# Patient Record
Sex: FEMALE | NOT HISPANIC OR LATINO | Employment: FULL TIME | URBAN - METROPOLITAN AREA
[De-identification: names, ages, dates, MRNs, and addresses within clinical notes are randomized per-mention and may not be internally consistent; named-entity substitution may affect disease eponyms.]

---

## 2018-07-12 ENCOUNTER — TELEPHONE (OUTPATIENT)
Dept: HEMATOLOGY | Facility: CLINIC | Age: 23
End: 2018-07-12

## 2018-07-12 NOTE — TELEPHONE ENCOUNTER
Phone call received from Deann Grove, mother of Zahida Grove.  Zahida has not been seen by our center but is scheduled as a new patient on 7/24/18.  She has a recenet diagnosis of TTP.  Per mother, they have been seen by hematology at the [facility] and at [facility].  Since we have not seen her, we do not have access to those records.    Mom says they are seeing us as a second opinion, but would like to transfer care to our center.  Mom says the current plan per outside providers is to start Rituxan treatments, with first dose scheduled for 7/23, the day before her visit.    She would like to discuss with Dr. Muhammad if possible or be seen prior to 7/23 so they can be assured that this is the best plan of care.    I recommended that she ask that the pertinent records be faxed to our center now so they can be reviewed and I would share this request for an earlier appt with Dr. Modi and nursing colleagues.  Best number to reach Mom back for an earlier appt is [phone].    Isabell Duggan, RN - Nurse Clinician - [facility] - [phone]

## 2018-07-17 ENCOUNTER — OFFICE VISIT (OUTPATIENT)
Dept: HEMATOLOGY | Facility: CLINIC | Age: 23
End: 2018-07-17

## 2018-07-17 ENCOUNTER — OFFICE VISIT (OUTPATIENT)
Dept: HEMATOLOGY | Facility: CLINIC | Age: 23
End: 2018-07-17
Attending: INTERNAL MEDICINE
Payer: COMMERCIAL

## 2018-07-17 VITALS
HEIGHT: 69 IN | SYSTOLIC BLOOD PRESSURE: 116 MMHG | BODY MASS INDEX: 26.94 KG/M2 | DIASTOLIC BLOOD PRESSURE: 71 MMHG | OXYGEN SATURATION: 100 % | RESPIRATION RATE: 12 BRPM | TEMPERATURE: 98 F | WEIGHT: 181.9 LBS | HEART RATE: 64 BPM

## 2018-07-17 DIAGNOSIS — Z71.9 ENCOUNTER FOR COUNSELING: Primary | ICD-10-CM

## 2018-07-17 DIAGNOSIS — M31.19 ACQUIRED TTP (H): ICD-10-CM

## 2018-07-17 PROCEDURE — 99203 OFFICE O/P NEW LOW 30 MIN: CPT | Performed by: INTERNAL MEDICINE

## 2018-07-17 RX ORDER — DIPHENHYDRAMINE HCL 50 MG
50 CAPSULE ORAL ONCE
Status: CANCELLED
Start: 2018-07-17

## 2018-07-17 RX ORDER — ACETAMINOPHEN 325 MG/1
650 TABLET ORAL ONCE
Status: CANCELLED
Start: 2018-07-17

## 2018-07-17 ASSESSMENT — PAIN SCALES - GENERAL: PAINLEVEL: NO PAIN (0)

## 2018-07-17 NOTE — MR AVS SNAPSHOT
After Visit Summary   7/17/2018    Zahida Grove    MRN: [id number]           Patient Information     Date Of Birth          1995        Visit Information        Provider Department      7/17/2018 1:59 PM Aminta Boston LICSW [facility]        Today's Diagnoses     Encounter for counseling    -  1       Follow-ups after your visit        Your next 10 appointments already scheduled     Jul 31, 2018 11:00 AM CDT   Infusion 360 with UC SPEC INFUSION, UC 44 ATC   [facility] ([facility])    [address]  [address]  [place] [address]   [phone]            Aug 07, 2018  8:00 AM CDT   Infusion 360 with UC SPEC INFUSION, UC 45 ATC   [facility] ([facility])    [address]  [address]  [place] [address] [phone]            Aug 09, 2018 12:00 PM CDT   RETURN CLOTTING DISORDER with Damián Modi MD   [facility] ([facility])    [facility] [address]  [address]  [place] [address]   [phone]            Aug 14, 2018  9:00 AM CDT   Infusion 360 with UC SPEC INFUSION, UC 46 ATC   [facility] ([facility])    [address]  [address]  [place] [id number] [phone]              Who to contact     If you have questions or need follow up information about today's clinic visit or your schedule please contact [facility] directly at [phone].  Normal or non-critical lab and imaging results will be communicated to you by MyChart, letter or phone within 4 business days after the clinic has received the results. If you do not hear from us within 7 days, please contact the clinic through FSLogixt or  phone. If you have a critical or abnormal lab result, we will notify you by phone as soon as possible.  Submit refill requests through [email or web address] or call your pharmacy and they will forward the refill request to us. Please allow 3 business days for your refill to be completed.          Additional Information About Your Visit        The [facility] Information     [email or web address] gives you secure access to your electronic health record. If you see a primary care provider, you can also send messages to your care team and make appointments. If you have questions, please call your primary care clinic.  If you do not have a primary care provider, please call [phone] and they will assist you.        Care EveryWhere ID     This is your Care EveryWhere ID. This could be used by other organizations to access your [place] medical records  [id number]         Blood Pressure from Last 3 Encounters:   07/23/18 128/56   07/17/18 116/71    Weight from Last 3 Encounters:   07/23/18 83.7 kg (184 lb 9.6 oz)   07/17/18 82.5 kg (181 lb 14.4 oz)              Today, you had the following     No orders found for display       Primary Care Provider    None Specified       No address on file        Equal Access to Services     [facility]: Hadii garrett Ochoa, wadalton rubin, qalucía ortiz, mickey santana . So [facility] [phone].    ATENCIÓN: Si habla español, tiene a pendleton disposición servicios gratuitos de asistencia lingüística. Llame al [phone].    We comply with applicable federal civil rights laws and [place] laws. We do not discriminate on the basis of race, color, national origin, age, disability, sex, sexual orientation, or gender identity.            Thank you!     Thank you for choosing [facility]  for your care. Our goal is always to provide you with excellent care. Hearing back from our patients is one way we can continue to improve our services. Please  take a few minutes to complete the written survey that you may receive in the mail after your visit with us. Thank you!             Your Updated Medication List - Protect others around you: Learn how to safely use, store and throw away your medicines at www.disposemymeds.org.      Notice  As of 7/17/2018 11:59 PM    You have not been prescribed any medications.

## 2018-07-17 NOTE — MR AVS SNAPSHOT
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  TTP occurs when the body's immune system makes an error and starts attacking a protein called UCTNSK89 with antibodies.  Antibodies are part of the immune system; for example, when we give you a vaccine, the goal is for you to make antibodies against that illness.      1. Continue Prednisone 100 mg daily until July 23.   2. Then take Prednsione 50 mg daily until July 30, Prednisone 20 mg August 6, then Prednisone 10 mg until August 13 and then stop  3. We will arrange for Rituximab infusions (weekly for 4 weeks) and you will receive a call to schedule these.  4. I am happy to help with blood counts as often as you would like.  I have placed standing orders for blood counts to be performed.  [facility] appears to be the closest lab to you.  5. I will get back to you regarding care for your apheresis catheter and when it should be removed.  I think we should give some more time before we remove it just in case you need more plasma exchange.  6. I will see you back in about 4 weeks to consider repeating your GIVQNL70 tests.    In the top picture below (A) you can see DEEOBR20 is important for making normal blood clots. The job of IWEVMO98 (pac-man shape in the picture) is to break up a sticky protein called Von Willebrand Factor, abreviated vWF, into small peices.    The bottom picture shows what happens in TTP.  BXAFVO17 is very low because your body is destroying it.  Because KFDUDW86 is low, vWF isn't broken up and these large sticky  proteins starts clumping together platelets.            Treatment for Acquired TTP is by using Plasma Exchange, which temporarily does 3 things:  1) Gets rid of the large, sticky vWF   2) removes antibodies that cause TTP and  3) gives you back BGOLJI09 from healthy blood donors.     The other part of treatment is to suppress the immune system and our standard first drug treatment is called Prednisone.  We will also likely try a medicine called Rituximab.  The medicine is given once per week for a total of 4 doses.    For more information about TTP I recommend:  Https://www.answeringttp.org/  https://www.nhlbi.nih.gov/health/health-topics/topics/ttp/ (you can also find this website by typing in NIH and TTP)    Reasons to call us at the [facility]:  1. Return of the symptoms you had when you were diagnosed with Acquired TTP  2. Easy bruising or bleeding  3. Confusion, headaches, forgetfulness  4. Unexplained fever    I recommend we follow your platelet count at least every week for the first month.  This will be done at the time of your plasma exchanges and then we can make a plan for those lab tests once you are finished with plasma exchange.  Then I recommend a blood count and checking the IYCQBE60 at 30 days from the end of your hospital stay. After that visit, I'll plan to see you about once per month until you are 3 months out, and then about every 3 months for at least the first year.              Follow-ups after your visit        Follow-up notes from your care team     Return in about 4 weeks (around 8/14/2018).      Who to contact     If you have questions or need follow up information about today's clinic visit or your schedule please contact [facility] directly at [phone].  Normal or non-critical lab and imaging results will be communicated to you by MyChart, letter or phone within 4 business days after the clinic has received the  "results. If you do not hear from us within 7 days, please contact the clinic through [email or web address] or phone. If you have a critical or abnormal lab result, we will notify you by phone as soon as possible.  Submit refill requests through [email or web address][employer]lt or call your pharmacy and they will forward the refill request to us. Please allow 3 business days for your refill to be completed.          Additional Information About Your Visit        [email or web address].lt Information     [email or web address] lets you send messages to your doctor, view your test results, renew your prescriptions, schedule appointments and more. To sign up, go to [email or web address] . Click on \"Log in\" on the left side of the screen, which will take you to the Welcome page. Then click on \"Sign up Now\" on the right side of the page.     You will be asked to enter the access code listed below, as well as some personal information. Please follow the directions to create your username and password.     Your access code is: [email or web address]  Expires: 10/15/2018  1:58 PM     Your access code will  in 90 days. If you need help or a new code, please call your [place] clinic or [phone].        Care EveryWhere ID     This is your Care EveryWhere ID. This could be used by other organizations to access your [place] medical records  [id number]        Your Vitals Were     Pulse Temperature Respirations Height Pulse Oximetry BMI (Body Mass Index)    64 98  F (36.7  C) (Oral) 12 1.74 m (5' 8.5\") 100% 27.26 kg/m2       Blood Pressure from Last 3 Encounters:   18 116/71    Weight from Last 3 Encounters:   18 82.5 kg (181 lb 14.4 oz)              Today, you had the following     No orders found for display       Primary Care Provider    None Specified       No primary provider on file.        Equal Access to Services     TIEN AGUILAR : Griffin Ochoa, randi rubin, mickey alas. So wayoshi " [phone].    ATENCIÓN: Si aideela robert, tiene a pendleton disposición servicios gratuitos de asistencia lingüística. Mary al [phone].    We comply with applicable federal civil rights laws and [place] laws. We do not discriminate on the basis of race, color, national origin, age, disability, sex, sexual orientation, or gender identity.            Thank you!     Thank you for choosing [facility]  for your care. Our goal is always to provide you with excellent care. Hearing back from our patients is one way we can continue to improve our services. Please take a few minutes to complete the written survey that you may receive in the mail after your visit with us. Thank you!             Your Updated Medication List - Protect others around you: Learn how to safely use, store and throw away your medicines at www.disposemymeds.org.      Notice  As of 7/17/2018  1:58 PM    You have not been prescribed any medications.

## 2018-07-17 NOTE — PROGRESS NOTES
[facility]  [address] [place] [address]  Phone: [phone], Fax: [phone]    Outpatient Visit Note:    Patient: Zahida Grove  MRN: [id number]  : 1995  JD: 2018    Reason for Consultation:  Zahida Grove is self referred for management of acquired TTP    History of Present Illness:  Zahida Grove is a 22 year old woman who was previously healthy until a recent diagnosis of acquired TTP.   She had recently returned home for being abroad in [place] for school and presented to her PCP with fatigue and heavy menstrual cycle.  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  She was initially treated with corticosteroids and IVIG ( and ) and then after review of her blood smear, she was noted to have 2% schistocytes and the working diagnosis was changed to acquired TTP.  She was started plasma exchange on  and VBJECV76 was drawn and found to be 12% with a pos Inhibitor ([place] lab).  Creatinine during the hospitalization peaked at 1.02 and she reports today that she never really had much in terms of neurologic symptoms.  She was discharged home on  and followed frequent platelet counts and had a peak of 520 on 18.  On a routine lab check on 18 her platelets had fallen to 45 with no other symptoms.  Thus plasma exchange was restarted for this exacerbation and she continued on high-dose Prednisone.  She again responded well to plasma exchange and platelets gonzalo to 132 on  and was discharged home with a plan for every-other-day exchange x 2.  Her last plasma exchange was  and has been doing well.      She reports no fevers, chills or night sweats.  Her apheresis catheter is in place without any discomfort or redness.  She reports no headaches, vision changes, altered thinking or concentration.  She more has been frustrated by this illness  and hospitalizations rather than feeling ill.      Past Medical History:  No past medical history on file.    Past Surgical History:  No past surgical history on file.    Medications:  Current Outpatient Prescriptions   Medication Sig Dispense Refill     folic acid (FOLVITE) 1 MG tablet Take 1 mg by mouth       omeprazole (PRILOSEC) 40 MG capsule Take 40 mg by mouth       predniSONE (DELTASONE) 20 MG tablet Take 20 mg by mouth          Allergies:  No Known Allergies    ROS:  Denies any bleeding issues. No gum bleeding, No nose bleed. Denies any hematuria or blood in stools. Denies any ecchymosis. Denies any lower extremities swelling or pain. Denies any fever, no chest pain. Denies any shortness of breath.     Social History:  Denies any tobacco use. No significant alcohol use. Denies any illicit drug use.  She is entering her final year of college.    Family History:  None of TTP but several family members have autoimmune disorders    Objectives:  Pleasant 22 year old female in no acute distress.  Vitals: B/P: 116/71, T: 98, P: 64, R: 12, Wt: 181 lbs 14.4 oz  Exam:   Gen: Appears well, no distress  HEENT: No scleral icterus or hemorrahge, no wet purpura, no lymphadenopathy  CV: regular, no murmurs  Pulm: clear  Abd: soft, nontender, no splenomegaly  Ext: no edema  Skin: no ecchymoses, catheter is clean and dry and no erythema  Neuro: no focal deficits, normal affect, normal cognition    Labs:  Results for MOODY GROVE (MRN [id number]) as of 8/7/2018 09:46   Ref. Range 7/18/2018 12:55   WBC Latest Ref Range: 4.0 - 11.0 10e9/L 15.4 (H)   Hemoglobin Latest Ref Range: 11.7 - 15.7 g/dL 11.9   Hematocrit Latest Ref Range: 35.0 - 47.0 % 36.7   Platelet Count Latest Ref Range: 150 - 450 10e9/L 363       Imaging:  none    Assessment:  In summary, Moody Grove is a 22 year old woman with acquired TTP, now with a second treatment response (platelet > 150) after an exacerbation of the disease while on high-dose prednisone.   I think she would benefit from up-front rituximab to prevent recurrence and at least 3 weeks of high-dose prednisone.  We will carefully monitor her platelet count for any evidence of recurrence with a plan to intervene with re-institution of plasma exchange with evidence of a downward trend in platelets or clinical evidence of neurologic sequelae of the disease.    Plan:  1. Majority of today's visit was spent counseling the patient regarding the diagnosis, treatment and natural history of acquired TTP.  I told her about resources for her including those on the internet (Facebook support pages, [employer] TTP foundation and understandingttp.com) as well as our own TTP support group [facility] at [facility]  2. Continue Prednisone 100mg until July 23 (end of 30 days of high dose Pred), then decrease to 50mg until July 30, then decrease to 20mg until Aug 6, then decrease to 10mg until Aug 13 and then stop.  3. Rituximab 375mg/m2 x 4 doses to be arranged  4. CBC standing orders have been placed, and recommend checking about 2x per wek  5. We will call regarding apheresis line care and I will alert our apheresis service about her.  6. RTC 4 weeks for possible TVNPED29 activity check and we will be in close contact by phone.    The patient is given our center's contact information and is instructed to call if she should have any further questions or concerns.      Martha Hunter, nurse clinician is present throughout the entire clinic visit with the patient today.  Patient understands and agrees with the above plan and recommendation.    Damián Modi MD   of Medicine  [facility] [facility] [employer]

## 2018-07-17 NOTE — PATIENT INSTRUCTIONS
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  TTP occurs when the body's immune system makes an error and starts attacking a protein called REPGZL69 with antibodies.  Antibodies are part of the immune system; for example, when we give you a vaccine, the goal is for you to make antibodies against that illness.      1. Continue Prednisone 100 mg daily until July 23.   2. Then take Prednsione 50 mg daily until July 30, Prednisone 20 mg August 6, then Prednisone 10 mg until August 13 and then stop  3. We will arrange for Rituximab infusions (weekly for 4 weeks) and you will receive a call to schedule these.  4. I am happy to help with blood counts as often as you would like.  I have placed standing orders for blood counts to be performed.  [facility] appears to [facility] the closest lab to you.  5. I will get back to you regarding care for your apheresis catheter and when it should be removed.  I think we should give some more time before we remove it just in case you need more plasma exchange.  6. I will see you back in about 4 weeks to consider repeating your AEDXNN54 tests.    In the top picture below (A) you can see NOCHRJ40 is important for making normal blood clots. The job of CWCKHE97 (pac-man shape in the picture) is to break up a sticky protein called Von Willebrand Factor, abreviated vWF, into small peices.    The bottom picture shows what happens in TTP.  TORQWH85 is very low because your body is destroying it.  Because JUMQXA91 is low, vWF isn't broken up and these large sticky proteins starts clumping together platelets.            Treatment for Acquired TTP is by using Plasma Exchange, which temporarily does 3 things:  1) Gets rid of the large, sticky vWF   2) removes antibodies that cause TTP and  3) gives you back VXOKYD74 from healthy blood donors.     The other part of treatment is to suppress the immune system and our standard first  drug treatment is called Prednisone.  We will also likely try a medicine called Rituximab.  The medicine is given once per week for a total of 4 doses.    For more information about TTP I recommend:  Https://www.answeringttp.org/  https://www.nhlbi.nih.gov/health/health-topics/topics/ttp/ (you can also find this website by typing in NIH and TTP)    Reasons to call us at the [facility]:  1. Return of the symptoms you had when you were diagnosed with Acquired TTP  2. Easy bruising or bleeding  3. Confusion, headaches, forgetfulness  4. Unexplained fever    I recommend we follow your platelet count at least every week for the first month.  This will be done at the time of your plasma exchanges and then we can make a plan for those lab tests once you are finished with plasma exchange.  Then I recommend a blood count and checking the TQFLYR57 at 30 days from the end of your hospital stay. After that visit, I'll plan to see you about once per month until you are 3 months out, and then about every 3 months for at least the first year.

## 2018-07-17 NOTE — PROGRESS NOTES
[facility]  Social Work Note    Zahida Grove is a 22 year old female who has been seen at [facility] and [facility] for acquired TTP. She presented to clinic this date for a second opinion regarding acquired TTP.    Met with Zahida and her parents to introduce self and role of SW, discuss applicable resources, and offer support.    Zahida signed consents this date for person to person communication including her parents and uncle and authorization for electronic communication for text and email, also including her mother.  These were sent to HIM for scanning.    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 Encouraged her to register with the office of accommodations on campus. 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  She stated that her internship site has been very flexible and she does not anticipate issues with juggling her internship and potential time away from internship for treatment.  Encouraged her to utilize the center for support in navigating this, if needed.  Discussed FMLA.    Brief education provided regarding mental health challenges that can accompany a diagnosis like TTP and encouraged her to utilize [facility] as needed for additional support.  Provided Zahida and family with information on the [facility]'s TTP support group and they stated they would like more information on the next support group, once scheduled.  Provided affirming statements to normalize and validate their experience and supportive counseling was offered throughout the encounter.    They did not have other questions or concerns at the conclusion of our visit.  Social Work contact information was provided.  Will plan to follow-up again during her next clinic visit at the [facility].    Aminta Boston, CHANDAN, LICSW, ACM  Clinical   [facility]  Advanced Therapies Program and  Clinical Trials Services  Phone: [phone]

## 2018-07-18 ENCOUNTER — ONCOLOGY VISIT (OUTPATIENT)
Dept: INFUSION THERAPY | Facility: CLINIC | Age: 23
End: 2018-07-18
Payer: COMMERCIAL

## 2018-07-18 DIAGNOSIS — M31.19 ACQUIRED TTP (H): ICD-10-CM

## 2018-07-18 LAB
DIFFERENTIAL METHOD BLD: ABNORMAL
ERYTHROCYTE [DISTWIDTH] IN BLOOD BY AUTOMATED COUNT: 14.6 % (ref 10–15)
HCT VFR BLD AUTO: 36.7 % (ref 35–47)
HGB BLD-MCNC: 11.9 G/DL (ref 11.7–15.7)
LYMPHOCYTES # BLD AUTO: 0.3 10E9/L (ref 0.8–5.3)
LYMPHOCYTES NFR BLD AUTO: 2 %
MCH RBC QN AUTO: 31.6 PG (ref 26.5–33)
MCHC RBC AUTO-ENTMCNC: 32.4 G/DL (ref 31.5–36.5)
MCV RBC AUTO: 98 FL (ref 78–100)
MONOCYTES # BLD AUTO: 0.6 10E9/L (ref 0–1.3)
MONOCYTES NFR BLD AUTO: 4 %
NEUTROPHILS # BLD AUTO: 14.5 10E9/L (ref 1.6–8.3)
NEUTROPHILS NFR BLD AUTO: 94 %
PLATELET # BLD AUTO: 363 10E9/L (ref 150–450)
PLATELET # BLD EST: ABNORMAL 10*3/UL
RBC # BLD AUTO: 3.76 10E12/L (ref 3.8–5.2)
RBC MORPH BLD: NORMAL
WBC # BLD AUTO: 15.4 10E9/L (ref 4–11)

## 2018-07-18 PROCEDURE — 99207 ZZC NO CHARGE NURSE ONLY: CPT

## 2018-07-18 PROCEDURE — 36592 COLLECT BLOOD FROM PICC: CPT | Performed by: INTERNAL MEDICINE

## 2018-07-18 PROCEDURE — 85025 COMPLETE CBC W/AUTO DIFF WBC: CPT | Performed by: INTERNAL MEDICINE

## 2018-07-18 RX ORDER — HEPARIN SODIUM (PORCINE) LOCK FLUSH IV SOLN 100 UNIT/ML 100 UNIT/ML
5 SOLUTION INTRAVENOUS
Status: DISCONTINUED | OUTPATIENT
Start: 2018-07-18 | End: 2018-07-18 | Stop reason: HOSPADM

## 2018-07-18 RX ADMIN — HEPARIN SODIUM (PORCINE) LOCK FLUSH IV SOLN 100 UNIT/ML 5 ML: 100 SOLUTION at 13:03

## 2018-07-18 RX ADMIN — HEPARIN SODIUM (PORCINE) LOCK FLUSH IV SOLN 100 UNIT/ML 5 ML: 100 SOLUTION at 13:02

## 2018-07-18 NOTE — PROGRESS NOTES
Citrate removed from both red and blue lumens. CBC sent. Both red and blue lumens flushed per documentation on MAR. Dressing dry and intact and not due to be changed until 7/20/18.    Discharged ambulatory with mom. To return tomorrow for flush of both catheter ports.    Fanta Chowdhury RN

## 2018-07-18 NOTE — MR AVS SNAPSHOT
After Visit Summary   7/18/2018    Zahida Grove    MRN: [id number]           Patient Information     Date Of Birth          1995        Visit Information        Provider Department      7/18/2018 12:30 PM BAY 3 INFUSION [facility]        Today's Diagnoses     Acquired TTP (H)           Follow-ups after your visit        Your next 10 appointments already scheduled     Jul 19, 2018 12:30 PM CDT   Level 1 with BAY 2 INFUSION   [facility] ([facility])    [address] [place] [id number] [phone]            Aug 09, 2018 12:00 PM CDT   RETURN CLOTTING DISORDER with Damián Modi MD   [facility] ([facility])    Formerly named [facility] [place] [id number] [phone]              Future tests that were ordered for you today     Open Standing Orders        Priority Remaining Interval Expires Ordered    *CBC with platelets differential Routine 19/20 twice per week 7/17/2019 7/17/2018            Who to contact     If you have questions or need follow up information about today's clinic visit or your schedule please contact [facility] directly at [phone].  Normal or non-critical lab and imaging results will be communicated to you by MyChart, letter or phone within 4 business days after the clinic has received the results. If you do not hear from us within 7 days, please contact the clinic through MyChart or phone. If you have a critical or abnormal lab result, we will notify you by phone as soon as possible.  Submit refill requests through [employer] or call your pharmacy and they will forward the refill request to us. Please allow 3 business days for your refill to be completed.          Additional Information About Your Visit        [employer] Information     [employer] is an electronic gateway that provides easy, online access  to your medical records. With [employer], you can request a clinic appointment, read your test results, renew a prescription or communicate with your care team.     To sign up for [employer] visit the website at [email or web address]   You will be asked to enter the access code listed below, as well as some personal information. Please follow the directions to create your username and password.     Your access code is: [email or web address]  Expires: 10/16/2018  1:02 PM     Your access code will  in 90 days. If you need help or a new code, please contact your [facility] or call [phone] for assistance.        Care EveryWhere ID     This is your Care EveryWhere ID. This could be used by other organizations to access your [place] medical records  [id number]         Blood Pressure from Last 3 Encounters:   18 116/71    Weight from Last 3 Encounters:   18 82.5 kg (181 lb 14.4 oz)              We Performed the Following     *CBC with platelets differential        Primary Care Provider Fax #    Physician No Ref-Primary [phone]       No address on file        Equal Access to Services     [facility]: Hadii aad ku hadasho Somarlinali, waaxda luqadaha, qaybta kaalmada adelaurayada, mickey santana . So [facility] [phone].    ATENCIÓN: Si habla español, tiene a pendleton disposición servicios gratuitos de asistencia lingüística. Llame al [phone].    We comply with applicable federal civil rights laws and [place] laws. We do not discriminate on the basis of race, color, national origin, age, disability, sex, sexual orientation, or gender identity.            Thank you!     Thank you for choosing [facility]  for your care. Our goal is always to provide you with excellent care. Hearing back from our patients is one way we can continue to improve our services. Please take a few minutes to complete the written survey that you may receive in  the mail after your visit with us. Thank you!             Your Updated Medication List - Protect others around you: Learn how to safely use, store and throw away your medicines at www.disposemymeds.org.      Notice  As of 7/18/2018  1:21 PM    You have not been prescribed any medications.

## 2018-07-19 ENCOUNTER — ONCOLOGY VISIT (OUTPATIENT)
Dept: ONCOLOGY | Facility: CLINIC | Age: 23
End: 2018-07-19
Payer: COMMERCIAL

## 2018-07-19 ENCOUNTER — HOME INFUSION (PRE-WILLOW HOME INFUSION) (OUTPATIENT)
Dept: PHARMACY | Facility: CLINIC | Age: 23
End: 2018-07-19

## 2018-07-19 DIAGNOSIS — M31.19 ACQUIRED TTP (H): Primary | ICD-10-CM

## 2018-07-19 PROCEDURE — 96523 IRRIG DRUG DELIVERY DEVICE: CPT

## 2018-07-19 RX ORDER — HEPARIN SODIUM (PORCINE) LOCK FLUSH IV SOLN 100 UNIT/ML 100 UNIT/ML
5 SOLUTION INTRAVENOUS EVERY 24 HOURS
Status: DISCONTINUED | OUTPATIENT
Start: 2018-07-19 | End: 2018-07-19 | Stop reason: HOSPADM

## 2018-07-19 RX ORDER — HEPARIN SODIUM (PORCINE) LOCK FLUSH IV SOLN 100 UNIT/ML 100 UNIT/ML
5 SOLUTION INTRAVENOUS
Status: DISCONTINUED | OUTPATIENT
Start: 2018-07-19 | End: 2018-07-19 | Stop reason: HOSPADM

## 2018-07-19 RX ADMIN — HEPARIN SODIUM (PORCINE) LOCK FLUSH IV SOLN 100 UNIT/ML 5 ML: 100 SOLUTION at 10:03

## 2018-07-19 RX ADMIN — HEPARIN SODIUM (PORCINE) LOCK FLUSH IV SOLN 100 UNIT/ML 5 ML: 100 SOLUTION at 10:01

## 2018-07-19 NOTE — MR AVS SNAPSHOT
After Visit Summary   7/19/2018    Zahida Grove    MRN: [id number]           Patient Information     Date Of Birth          1995        Visit Information        Provider Department      7/19/2018 10:00 AM NURSE ONLY CANCER CENTER [facility]        Today's Diagnoses     Acquired TTP (H)    -  1       Follow-ups after your visit        Your next 10 appointments already scheduled     Jul 20, 2018  9:00 AM CDT   Return Visit with NURSE ONLY CANCER CENTER   [facility] ([facility])    [address] [place] [id number] [phone]            Jul 23, 2018  9:00 AM CDT   Infusion 360 with UC SPEC INFUSION, UC 46 ATC   [facility] ([facility])    [address]  [address]  [facility] [id number] [phone]            Jul 31, 2018 11:00 AM CDT   Infusion 360 with UC SPEC INFUSION, UC 44 ATC   [facility] ([facility])    [address]  [address]  [facility] [id number] [phone]            Aug 07, 2018  8:00 AM CDT   Infusion 360 with UC SPEC INFUSION, UC 45 ATC   [facility] ([facility])    [address]  [address]  [facility] [id number] [phone]            Aug 09, 2018 12:00 PM CDT   RETURN CLOTTING DISORDER with Damián Modi MD   [facility])    [facility]  [address]  [facility] [address] [phone]            Aug 14, 2018  9:00 AM CDT   Infusion 360 with UC SPEC INFUSION, UC 46 ATC   [facility] ([facility])    [address]  [address]  [facility] [id number] [phone]              Who to  contact     If you have questions or need follow up information about today's clinic visit or your schedule please contact [facility] directly at [phone].  Normal or non-critical lab and imaging results will be communicated to you by MyChart, letter or phone within 4 business days after the clinic has received the results. If you do not hear from us within 7 days, please contact the clinic through MyChart or phone. If you have a critical or abnormal lab result, we will notify you by phone as soon as possible.  Submit refill requests through [employer] or call your pharmacy and they will forward the refill request to us. Please allow 3 business days for your refill to be completed.          Additional Information About Your Visit        [employer] Information     [employer] is an electronic gateway that provides easy, online access to your medical records. With [employer], you can request a clinic appointment, read your test results, renew a prescription or communicate with your care team.     To sign up for [employer] visit the website at [email or web address]   You will be asked to enter the access code listed below, as well as some personal information. Please follow the directions to create your username and password.     Your access code is: [email or web address]  Expires: 10/16/2018  1:02 PM     Your access code will  in 90 days. If you need help or a new code, please contact your [facility] or call [phone] for assistance.        Care EveryWhere ID     This is your Care EveryWhere ID. This could be used by other organizations to access your [place] medical records  [id number]         Blood Pressure from Last 3 Encounters:   18 116/71    Weight from Last 3 Encounters:   18 82.5 kg (181 lb 14.4 oz)              Today, you had the following     No orders found for display       Primary Care Provider Fax #    Physician No Ref-Primary [phone]        No address on file        Equal Access to Services     [facility] LAUREN : Hadii aad ku hadcherileigh ann Karlainessa, waherbieyulisa greshamtariqha, xinjuan antonio coolcirayulisa ortiz, mickey arnold. So [facility] [phone].    ATENCIÓN: Si habla español, tiene a pendleton disposición servicios gratuitos de asistencia lingüística. Llame al [phone].    We comply with applicable federal civil rights laws and [place] laws. We do not discriminate on the basis of race, color, national origin, age, disability, sex, sexual orientation, or gender identity.            Thank you!     Thank you for choosing [facility]  for your care. Our goal is always to provide you with excellent care. Hearing back from our patients is one way we can continue to improve our services. Please take a few minutes to complete the written survey that you may receive in the mail after your visit with us. Thank you!             Your Updated Medication List - Protect others around you: Learn how to safely use, store and throw away your medicines at www.disposemymeds.org.      Notice  As of 7/19/2018 10:13 AM    You have not been prescribed any medications.

## 2018-07-19 NOTE — PROGRESS NOTES
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 Both lumens flushed easily. Tolerated well without complaint. See documentation flow sheet. Pt has a flush appointment scheduled for tomorrow which she may cancel if home health is set up in time. States her dressing was changed 4 days ago. Charis Bourne RN

## 2018-07-20 ENCOUNTER — HOME INFUSION (PRE-WILLOW HOME INFUSION) (OUTPATIENT)
Dept: PHARMACY | Facility: CLINIC | Age: 23
End: 2018-07-20

## 2018-07-20 LAB
ERYTHROCYTE [DISTWIDTH] IN BLOOD BY AUTOMATED COUNT: 15.2 % (ref 10–15)
HCT VFR BLD AUTO: 36.2 % (ref 35–47)
HGB BLD-MCNC: 11.7 G/DL (ref 11.7–15.7)
MCH RBC QN AUTO: 31.3 PG (ref 26.5–33)
MCHC RBC AUTO-ENTMCNC: 32.3 G/DL (ref 31.5–36.5)
MCV RBC AUTO: 97 FL (ref 78–100)
PLATELET # BLD AUTO: 357 10E9/L (ref 150–450)
RBC # BLD AUTO: 3.74 10E12/L (ref 3.8–5.2)
WBC # BLD AUTO: 11.5 10E9/L (ref 4–11)

## 2018-07-20 PROCEDURE — 85027 COMPLETE CBC AUTOMATED: CPT | Performed by: INTERNAL MEDICINE

## 2018-07-20 NOTE — PROGRESS NOTES
This is a recent snapshot of the patient's [facility] medical record.  For current drug dose and complete information and questions, call [phone] or In Basket pool, fv home infusion ([id number])  CSN Number:  [id number]

## 2018-07-23 ENCOUNTER — INFUSION THERAPY VISIT (OUTPATIENT)
Dept: INFUSION THERAPY | Facility: CLINIC | Age: 23
End: 2018-07-23
Attending: INTERNAL MEDICINE
Payer: COMMERCIAL

## 2018-07-23 VITALS
BODY MASS INDEX: 27.34 KG/M2 | SYSTOLIC BLOOD PRESSURE: 128 MMHG | HEART RATE: 83 BPM | HEIGHT: 69 IN | TEMPERATURE: 98.1 F | DIASTOLIC BLOOD PRESSURE: 56 MMHG | WEIGHT: 184.6 LBS | OXYGEN SATURATION: 99 %

## 2018-07-23 DIAGNOSIS — M31.19 ACQUIRED TTP (H): Primary | ICD-10-CM

## 2018-07-23 LAB
BASOPHILS # BLD AUTO: 0 10E9/L (ref 0–0.2)
BASOPHILS NFR BLD AUTO: 0.2 %
DIFFERENTIAL METHOD BLD: ABNORMAL
EOSINOPHIL # BLD AUTO: 0.2 10E9/L (ref 0–0.7)
EOSINOPHIL NFR BLD AUTO: 1.6 %
ERYTHROCYTE [DISTWIDTH] IN BLOOD BY AUTOMATED COUNT: 14.1 % (ref 10–15)
HCT VFR BLD AUTO: 33.3 % (ref 35–47)
HGB BLD-MCNC: 11.5 G/DL (ref 11.7–15.7)
IMM GRANULOCYTES # BLD: 0.2 10E9/L (ref 0–0.4)
IMM GRANULOCYTES NFR BLD: 1.5 %
LYMPHOCYTES # BLD AUTO: 1.3 10E9/L (ref 0.8–5.3)
LYMPHOCYTES NFR BLD AUTO: 11.1 %
MCH RBC QN AUTO: 32.2 PG (ref 26.5–33)
MCHC RBC AUTO-ENTMCNC: 34.5 G/DL (ref 31.5–36.5)
MCV RBC AUTO: 93 FL (ref 78–100)
MONOCYTES # BLD AUTO: 1 10E9/L (ref 0–1.3)
MONOCYTES NFR BLD AUTO: 9.2 %
NEUTROPHILS # BLD AUTO: 8.6 10E9/L (ref 1.6–8.3)
NEUTROPHILS NFR BLD AUTO: 76.4 %
NRBC # BLD AUTO: 0 10*3/UL
NRBC BLD AUTO-RTO: 0 /100
PLATELET # BLD AUTO: 279 10E9/L (ref 150–450)
RBC # BLD AUTO: 3.57 10E12/L (ref 3.8–5.2)
WBC # BLD AUTO: 11.2 10E9/L (ref 4–11)

## 2018-07-23 PROCEDURE — 25000128 H RX IP 250 OP 636: Mod: ZF | Performed by: INTERNAL MEDICINE

## 2018-07-23 PROCEDURE — 96415 CHEMO IV INFUSION ADDL HR: CPT

## 2018-07-23 PROCEDURE — 85025 COMPLETE CBC W/AUTO DIFF WBC: CPT | Performed by: INTERNAL MEDICINE

## 2018-07-23 PROCEDURE — 96413 CHEMO IV INFUSION 1 HR: CPT

## 2018-07-23 PROCEDURE — 25000132 ZZH RX MED GY IP 250 OP 250 PS 637: Mod: ZF | Performed by: INTERNAL MEDICINE

## 2018-07-23 PROCEDURE — 96375 TX/PRO/DX INJ NEW DRUG ADDON: CPT

## 2018-07-23 RX ORDER — DIPHENHYDRAMINE HCL 25 MG
50 CAPSULE ORAL ONCE
Status: CANCELLED
Start: 2018-07-23

## 2018-07-23 RX ORDER — METHYLPREDNISOLONE SODIUM SUCCINATE 125 MG/2ML
100 INJECTION, POWDER, LYOPHILIZED, FOR SOLUTION INTRAMUSCULAR; INTRAVENOUS ONCE
Status: COMPLETED | OUTPATIENT
Start: 2018-07-23 | End: 2018-07-23

## 2018-07-23 RX ORDER — DIPHENHYDRAMINE HCL 25 MG
50 CAPSULE ORAL ONCE
Status: COMPLETED | OUTPATIENT
Start: 2018-07-23 | End: 2018-07-23

## 2018-07-23 RX ORDER — HEPARIN SODIUM (PORCINE) LOCK FLUSH IV SOLN 100 UNIT/ML 100 UNIT/ML
5 SOLUTION INTRAVENOUS ONCE
Status: COMPLETED | OUTPATIENT
Start: 2018-07-23 | End: 2018-07-23

## 2018-07-23 RX ORDER — ACETAMINOPHEN 325 MG/1
650 TABLET ORAL ONCE
Status: CANCELLED
Start: 2018-07-23

## 2018-07-23 RX ORDER — ACETAMINOPHEN 325 MG/1
650 TABLET ORAL ONCE
Status: COMPLETED | OUTPATIENT
Start: 2018-07-23 | End: 2018-07-23

## 2018-07-23 RX ORDER — HEPARIN SODIUM (PORCINE) LOCK FLUSH IV SOLN 100 UNIT/ML 100 UNIT/ML
5 SOLUTION INTRAVENOUS ONCE
Status: CANCELLED
Start: 2018-07-23 | End: 2018-07-23

## 2018-07-23 RX ORDER — METHYLPREDNISOLONE SODIUM SUCCINATE 125 MG/2ML
100 INJECTION, POWDER, LYOPHILIZED, FOR SOLUTION INTRAMUSCULAR; INTRAVENOUS ONCE
Status: CANCELLED | OUTPATIENT
Start: 2018-07-23

## 2018-07-23 RX ADMIN — RITUXIMAB 750 MG: 10 INJECTION, SOLUTION INTRAVENOUS at 10:09

## 2018-07-23 RX ADMIN — DIPHENHYDRAMINE HYDROCHLORIDE 50 MG: 25 CAPSULE ORAL at 09:34

## 2018-07-23 RX ADMIN — METHYLPREDNISOLONE SODIUM SUCCINATE 100 MG: 125 INJECTION, POWDER, FOR SOLUTION INTRAMUSCULAR; INTRAVENOUS at 09:37

## 2018-07-23 RX ADMIN — SODIUM CHLORIDE, PRESERVATIVE FREE 5 ML: 5 INJECTION INTRAVENOUS at 11:49

## 2018-07-23 RX ADMIN — ACETAMINOPHEN 650 MG: 325 TABLET ORAL at 09:34

## 2018-07-23 RX ADMIN — SODIUM CHLORIDE, PRESERVATIVE FREE 5 ML: 5 INJECTION INTRAVENOUS at 11:50

## 2018-07-23 NOTE — PROGRESS NOTES
Nursing Note  Zahida Grove [facility]:   Chief Complaint   Patient presents with     Infusion     RITUXAN      During today's [facility] appointment, orders from Dr. Modi were completed.  Frequency: weekly and today is dose 1 of 4 total.    Progress note:  Patient identification verified by name and date of birth.  Assessment completed.  Vitals recorded in Doc Flowsheets.  Patient was provided with education regarding infusion and possible side effects.  Patient verbalized understanding.      needed: No  Premedications: administered per order.  Infusion Rates: infusion starts at 50 ml/hr, then increased by 50 ml/hr every 30 minutes to final rate of 400 ml/hr.  Approximate Infusion length:3 hours. and 45 mins   Labs: were drawn per orders.   Vascular access:PIV placed today, orders received for okay to use tunneled central line for future infusions. Tunneled line dressing/cap change completed today and locked with 100 units/ml heparin per orders.     Additional nursing time spent: 6 minutes    Treatment Conditions:   ~~~ NOTE: If the patient answers yes to any of the questions below, hold the infusion and contact ordering provider or on-call provider.    1. Have you recently had an elevated temperature, fever, chills, productive cough, coughing for 3 weeks or longer or hemoptysis,  abnormal vital signs, night sweats,  chest pain or have you noticed a decrease in your appetite, unexplained weight loss or fatigue? No  2. Do you have any open wounds or new incisions? Yes, pilonidal cyst  3. Do you have any recent or upcoming hospitalizations, surgeries or dental procedures? Yes pt is planning to have surgery to cyst but has been told to wait until after rituxan therapy.   4. Do you currently have or recently have had any signs of illness or infection or are you on any antibiotics? No but just finished abx a few days ago for  cyst  5. Have you had any new, sudden or worsening abdominal pain? No  6. Have you or anyone in your household received a live vaccination in the past 4 weeks? Please note:  No live vaccines while on biologic/chemotherapy until 6 months after the last treatment.  Patient can receive the flu vaccine (shot only) and the pneumovax.  It is optimal for the patient to get these vaccines mid cycle, but they can be given at any time as long as it is not on the day of the infusion. No  7. Have you recently been diagnosed with any new nervous system diseases (ie. Multiple sclerosis, Guillain [place], seizures, neurological changes) or cancer diagnosis? Are you on any form of radiation or chemotherapy? No  8. Are you pregnant or breast feeding or do you have plans of pregnancy in the future? No  9. Have you been having any signs of worsening depression or suicidal ideations?  (benlysta only) No  10. Have there been any other new onset medical symptoms? No  Dr. Modi was paged regarding questions above for which pt answered 'yes.' Per Md okay to proceed with today's infusion.       Patient tolerated infusion: well.    Drug Waste Record? Yes      Drug Name:solu-medrol  Dose: 100 mg  Route administered: IV  NDC #: 0530-7394-43  Amount of waste(mL):0.4 mls  Reason for waste: Single use vial     Discharge Plan:   Follow up plan of care with: ongoing infusions [facility]. and primary medical doctor.  Discharge instructions were reviewed with patient.  Patient/representative verbalized understanding of discharge instructions and all questions answered.  Patient discharged from [facility] in stable condition.    Zahraa Cruz RN  Administrations This Visit     acetaminophen (TYLENOL) tablet 650 mg     Admin Date Action Dose Route Administered By             07/23/2018 Given 650 mg Oral Zahraa Cruz RN                    diphenhydrAMINE (BENADRYL) capsule 50 mg     Admin Date  "Action Dose Route Administered By             07/23/2018 Given 50 mg Oral Zahraa Cruz RN                    heparin 100 UNIT/ML injection 5 mL     Admin Date Action Dose Route Administered By             07/23/2018 Given 5 mL Intracatheter Zahraa Cruz RN              Admin Date Action Dose Route Administered By             07/23/2018 Given 5 mL Intracatheter Zahraa Cruz RN                    methylPREDNISolone sodium succinate (solu-MEDROL) injection 100 mg     Admin Date Action Dose Route Administered By             07/23/2018 Given 100 mg Intravenous Zahraa Cruz RN                    riTUXimab (RITUXAN) 750 mg in sodium chloride 0.9 % 750 mL non-oncology use     Admin Date Action Dose Route Administered By             07/23/2018 New Bag 750 mg Intravenous Zahraa Cruz RN                              /74  Pulse 97  Temp 95.4  F (35.2  C) (Oral)  Ht 1.74 m (5' 8.5\")  Wt 83.7 kg (184 lb 9.6 oz)  SpO2 100%  BMI 27.66 kg/m2    "

## 2018-07-23 NOTE — PROGRESS NOTES
This is a recent snapshot of the patient's [facility] medical record.  For current drug dose and complete information and questions, call [phone] or In Basket pool, fv home infusion ([id number])  CSN Number:  [id number]

## 2018-07-23 NOTE — MR AVS SNAPSHOT
After Visit Summary   7/23/2018    Zahida Grove    MRN: [id number]           Patient Information     Date Of Birth          1995        Visit Information        Provider Department      7/23/2018 9:00 AM UC 46 ATC; UC SPEC INFUSION [facility]        Today's Diagnoses     Acquired TTP (H)    -  1      Care Instructions      Rituximab Solution for injection  What is this medicine?  RITUXIMAB (ri TUX i mab) is a monoclonal antibody. This medicine changes the way the body's immune system works. It is used commonly to treat non-Hodgkin lymphoma and other conditions. In cancer cells, this drug targets a specific protein within cancer cells and stops the cancer cells from growing. It is also used to treat rheumatoid arthritis (RA). In RA, this medicine slows the inflammatory process and help reduce joint pain and swelling. This medicine is often used with other cancer or arthritis medications.  This medicine may be used for other purposes; ask your health care provider or pharmacist if you have questions.  What should I tell my health care provider before I take this medicine?  They need to know if you have any of these conditions:    blood disorders    heart disease    history of hepatitis B    infection (especially a virus infection such as chickenpox, cold sores, or herpes)    irregular heartbeat    kidney disease    lung or breathing disease, like asthma    lupus    an unusual or allergic reaction to rituximab, mouse proteins, other medicines, foods, dyes, or preservatives    pregnant or trying to get pregnant    breast-feeding  How should I use this medicine?  This medicine is for infusion into a vein. It is administered in a hospital or clinic by a specially trained health care professional.  A special MedGuide will be given to you by the pharmacist with each prescription and refill. Be sure to read this information carefully each time.  Talk to your  pediatrician regarding the use of this medicine in children. This medicine is not approved for use in children.  Overdosage: If you think you have taken too much of this medicine contact a poison control center or emergency room at once.  NOTE: This medicine is only for you. Do not share this medicine with others.  What if I miss a dose?  It is important not to miss a dose. Call your doctor or health care professional if you are unable to keep an appointment.  What may interact with this medicine?    cisplatin    medicines for blood pressure    some other medicines for arthritis    vaccines  This list may not describe all possible interactions. Give your health care provider a list of all the medicines, herbs, non-prescription drugs, or dietary supplements you use. Also tell them if you smoke, drink alcohol, or use illegal drugs. Some items may interact with your medicine.  What should I watch for while using this medicine?  Report any side effects that you notice during your treatment right away, such as changes in your breathing, fever, chills, dizziness or lightheadedness. These effects are more common with the first dose.  Visit your prescriber or health care professional for checks on your progress. You will need to have regular blood work. Report any other side effects. The side effects of this medicine can continue after you finish your treatment. Continue your course of treatment even though you feel ill unless your doctor tells you to stop.  Call your doctor or health care professional for advice if you get a fever, chills or sore throat, or other symptoms of a cold or flu. Do not treat yourself. This drug decreases your body's ability to fight infections. Try to avoid being around people who are sick.  This medicine may increase your risk to bruise or bleed. Call your doctor or health care professional if you notice any unusual bleeding.  Be careful brushing and flossing your teeth or using a toothpick  because you may get an infection or bleed more easily. If you have any dental work done, tell your dentist you are receiving this medicine.  Avoid taking products that contain aspirin, acetaminophen, ibuprofen, naproxen, or ketoprofen unless instructed by your doctor. These medicines may hide a fever.  Do not become pregnant while taking this medicine. Women should inform their doctor if they wish to become pregnant or think they might be pregnant. There is a potential for serious side effects to an unborn child. Talk to your health care professional or pharmacist for more information. Do not breast-feed an infant while taking this medicine.  What side effects may I notice from receiving this medicine?  Side effects that you should report to your doctor or health care professional as soon as possible:    allergic reactions like skin rash, itching or hives, swelling of the face, lips, or tongue    low blood counts - this medicine may decrease the number of white blood cells, red blood cells and platelets. You may be at increased risk for infections and bleeding.    signs of infection - fever or chills, cough, sore throat, pain or difficulty passing urine    signs of decreased platelets or bleeding - bruising, pinpoint red spots on the skin, black, tarry stools, blood in the urine    signs of decreased red blood cells - unusually weak or tired, fainting spells, lightheadedness    breathing problems    confused, not responsive    chest pain    fast, irregular heartbeat    feeling faint or lightheaded, falls    mouth sores    redness, blistering, peeling or loosening of the skin, including inside the mouth    stomach pain    swelling of the ankles, feet, or hands    trouble passing urine or change in the amount of urine  Side effects that usually do not require medical attention (report to your doctor or other health care professional if they continue or are bothersome):    anxiety    headache    loss of  appetite    muscle aches    nausea    night sweats  This list may not describe all possible side effects. Call your doctor for medical advice about side effects. You may report side effects to FDA at 4-359-DLZ-5691.  Where should I keep my medicine?  This drug is given in a hospital or clinic and will not be stored at home.  NOTE:This sheet is a summary. It may not cover all possible information. If you have questions about this medicine, talk to your doctor, pharmacist, or health care provider. Copyright  2016 Gold Standard                Follow-ups after your visit        Your next 10 appointments already scheduled     Jul 31, 2018 11:00 AM CDT   Infusion 360 with UC SPEC INFUSION, UC 44 ATC   [facility] ([facility])    [address]  [address]  [facility] [id number] [phone]            Aug 07, 2018  8:00 AM CDT   Infusion 360 with UC SPEC INFUSION, UC 45 ATC   [facility] ([facility])    [address]  [address]  [facility] [id number] [phone]            Aug 09, 2018 12:00 PM CDT   RETURN CLOTTING DISORDER with Damián Modi MD   [facility] ([facility], [facility])    [facility]  [address]  [facility] [address] [phone]            Aug 14, 2018  9:00 AM CDT   Infusion 360 with UC SPEC INFUSION, UC 46 ATC   [facility] ([facility])    [address]  [address]  [facility] [id number] [phone]              Who to contact     If you have questions or need follow up information about today's clinic visit or your schedule please contact [facility] directly at [phone].  Normal or non-critical lab and imaging results will be  "communicated to you by MyChart, letter or phone within 4 business days after the clinic has received the results. If you do not hear from us within 7 days, please contact the clinic through [employer] or phone. If you have a critical or abnormal lab result, we will notify you by phone as soon as possible.  Submit refill requests through [employer] or call your pharmacy and they will forward the refill request to us. Please allow 3 business days for your refill to be completed.          Additional Information About Your Visit        [employer] Information     [employer] lets you send messages to your doctor, view your test results, renew your prescriptions, schedule appointments and more. To sign up, go to [email or web address] . Click on \"Log in\" on the left side of the screen, which will take you to the Welcome page. Then click on \"Sign up Now\" on the right side of the page.     You will be asked to enter the access code listed below, as well as some personal information. Please follow the directions to create your username and password.     Your access code is: [email or web address]  Expires: 10/16/2018  1:02 PM     Your access code will  in 90 days. If you need help or a new code, please call your [place] clinic or [phone].        Care EveryWhere ID     This is your Care EveryWhere ID. This could be used by other organizations to access your [place] medical records  [id number]        Your Vitals Were     Pulse Temperature Height Pulse Oximetry BMI (Body Mass Index)       79 98.1  F (36.7  C) (Oral) 1.74 m (5' 8.5\") 99% 27.66 kg/m2        Blood Pressure from Last 3 Encounters:   18 134/75   18 116/71    Weight from Last 3 Encounters:   18 83.7 kg (184 lb 9.6 oz)   18 82.5 kg (181 lb 14.4 oz)              We Performed the Following     *CBC with platelets differential        Primary Care Provider Fax #    Physician No Ref-Primary [phone]       No address on file        Equal Access to " Services     [facility]: Hadii garrett Ochoa, wadalton rubin, qalucía ortiz, mickey arnold. So [facility] [phone].    ATENCIÓN: Si habla español, tiene a pendleton disposición servicios gratuitos de asistencia lingüística. Llame al [phone].    We comply with applicable federal civil rights laws and [place] laws. We do not discriminate on the basis of race, color, national origin, age, disability, sex, sexual orientation, or gender identity.            Thank you!     Thank you for choosing [facility]  for your care. Our goal is always to provide you with excellent care. Hearing back from our patients is one way we can continue to improve our services. Please take a few minutes to complete the written survey that you may receive in the mail after your visit with us. Thank you!             Your Updated Medication List - Protect others around you: Learn how to safely use, store and throw away your medicines at www.disposemymeds.org.      Notice  As of 7/23/2018  1:40 PM    You have not been prescribed any medications.

## 2018-07-23 NOTE — PATIENT INSTRUCTIONS
Rituximab Solution for injection  What is this medicine?  RITUXIMAB (ri TUX i mab) is a monoclonal antibody. This medicine changes the way the body's immune system works. It is used commonly to treat non-Hodgkin lymphoma and other conditions. In cancer cells, this drug targets a specific protein within cancer cells and stops the cancer cells from growing. It is also used to treat rheumatoid arthritis (RA). In RA, this medicine slows the inflammatory process and help reduce joint pain and swelling. This medicine is often used with other cancer or arthritis medications.  This medicine may be used for other purposes; ask your health care provider or pharmacist if you have questions.  What should I tell my health care provider before I take this medicine?  They need to know if you have any of these conditions:    blood disorders    heart disease    history of hepatitis B    infection (especially a virus infection such as chickenpox, cold sores, or herpes)    irregular heartbeat    kidney disease    lung or breathing disease, like asthma    lupus    an unusual or allergic reaction to rituximab, mouse proteins, other medicines, foods, dyes, or preservatives    pregnant or trying to get pregnant    breast-feeding  How should I use this medicine?  This medicine is for infusion into a vein. It is administered in a hospital or clinic by a specially trained health care professional.  A special MedGuide will be given to you by the pharmacist with each prescription and refill. Be sure to read this information carefully each time.  Talk to your pediatrician regarding the use of this medicine in children. This medicine is not approved for use in children.  Overdosage: If you think you have taken too much of this medicine contact a poison control center or emergency room at once.  NOTE: This medicine is only for you. Do not share this medicine with others.  What if I miss a dose?  It is important not to miss a dose. Call your  doctor or health care professional if you are unable to keep an appointment.  What may interact with this medicine?    cisplatin    medicines for blood pressure    some other medicines for arthritis    vaccines  This list may not describe all possible interactions. Give your health care provider a list of all the medicines, herbs, non-prescription drugs, or dietary supplements you use. Also tell them if you smoke, drink alcohol, or use illegal drugs. Some items may interact with your medicine.  What should I watch for while using this medicine?  Report any side effects that you notice during your treatment right away, such as changes in your breathing, fever, chills, dizziness or lightheadedness. These effects are more common with the first dose.  Visit your prescriber or health care professional for checks on your progress. You will need to have regular blood work. Report any other side effects. The side effects of this medicine can continue after you finish your treatment. Continue your course of treatment even though you feel ill unless your doctor tells you to stop.  Call your doctor or health care professional for advice if you get a fever, chills or sore throat, or other symptoms of a cold or flu. Do not treat yourself. This drug decreases your body's ability to fight infections. Try to avoid being around people who are sick.  This medicine may increase your risk to bruise or bleed. Call your doctor or health care professional if you notice any unusual bleeding.  Be careful brushing and flossing your teeth or using a toothpick because you may get an infection or bleed more easily. If you have any dental work done, tell your dentist you are receiving this medicine.  Avoid taking products that contain aspirin, acetaminophen, ibuprofen, naproxen, or ketoprofen unless instructed by your doctor. These medicines may hide a fever.  Do not become pregnant while taking this medicine. Women should inform their doctor if  they wish to become pregnant or think they might be pregnant. There is a potential for serious side effects to an unborn child. Talk to your health care professional or pharmacist for more information. Do not breast-feed an infant while taking this medicine.  What side effects may I notice from receiving this medicine?  Side effects that you should report to your doctor or health care professional as soon as possible:    allergic reactions like skin rash, itching or hives, swelling of the face, lips, or tongue    low blood counts - this medicine may decrease the number of white blood cells, red blood cells and platelets. You may be at increased risk for infections and bleeding.    signs of infection - fever or chills, cough, sore throat, pain or difficulty passing urine    signs of decreased platelets or bleeding - bruising, pinpoint red spots on the skin, black, tarry stools, blood in the urine    signs of decreased red blood cells - unusually weak or tired, fainting spells, lightheadedness    breathing problems    confused, not responsive    chest pain    fast, irregular heartbeat    feeling faint or lightheaded, falls    mouth sores    redness, blistering, peeling or loosening of the skin, including inside the mouth    stomach pain    swelling of the ankles, feet, or hands    trouble passing urine or change in the amount of urine  Side effects that usually do not require medical attention (report to your doctor or other health care professional if they continue or are bothersome):    anxiety    headache    loss of appetite    muscle aches    nausea    night sweats  This list may not describe all possible side effects. Call your doctor for medical advice about side effects. You may report side effects to FDA at 6-267-VHW-1039.  Where should I keep my medicine?  This drug is given in a hospital or clinic and will not be stored at home.  NOTE:This sheet is a summary. It may not cover all possible information. If you  have questions about this medicine, talk to your doctor, pharmacist, or health care provider. Copyright  2016 Gold Standard

## 2018-07-24 ENCOUNTER — HOME INFUSION (PRE-WILLOW HOME INFUSION) (OUTPATIENT)
Dept: PHARMACY | Facility: CLINIC | Age: 23
End: 2018-07-24

## 2018-07-25 DIAGNOSIS — M31.19 ACQUIRED TTP (H): ICD-10-CM

## 2018-07-25 LAB
BASOPHILS # BLD AUTO: 0.1 10E9/L (ref 0–0.2)
BASOPHILS NFR BLD AUTO: 0.5 %
DIFFERENTIAL METHOD BLD: NORMAL
EOSINOPHIL # BLD AUTO: 0.1 10E9/L (ref 0–0.7)
EOSINOPHIL NFR BLD AUTO: 1.4 %
ERYTHROCYTE [DISTWIDTH] IN BLOOD BY AUTOMATED COUNT: 13.9 % (ref 10–15)
HCT VFR BLD AUTO: 38.5 % (ref 35–47)
HGB BLD-MCNC: 12.9 G/DL (ref 11.7–15.7)
IMM GRANULOCYTES # BLD: 0.2 10E9/L (ref 0–0.4)
IMM GRANULOCYTES NFR BLD: 2.1 %
LYMPHOCYTES # BLD AUTO: 0.9 10E9/L (ref 0.8–5.3)
LYMPHOCYTES NFR BLD AUTO: 8.8 %
MCH RBC QN AUTO: 31.9 PG (ref 26.5–33)
MCHC RBC AUTO-ENTMCNC: 33.5 G/DL (ref 31.5–36.5)
MCV RBC AUTO: 95 FL (ref 78–100)
MONOCYTES # BLD AUTO: 0.7 10E9/L (ref 0–1.3)
MONOCYTES NFR BLD AUTO: 7.3 %
NEUTROPHILS # BLD AUTO: 8 10E9/L (ref 1.6–8.3)
NEUTROPHILS NFR BLD AUTO: 79.9 %
PLATELET # BLD AUTO: 297 10E9/L (ref 150–450)
RBC # BLD AUTO: 4.05 10E12/L (ref 3.8–5.2)
WBC # BLD AUTO: 10 10E9/L (ref 4–11)

## 2018-07-25 PROCEDURE — 85025 COMPLETE CBC W/AUTO DIFF WBC: CPT | Performed by: INTERNAL MEDICINE

## 2018-07-25 PROCEDURE — 36415 COLL VENOUS BLD VENIPUNCTURE: CPT | Performed by: INTERNAL MEDICINE

## 2018-07-25 NOTE — PROGRESS NOTES
This is a recent snapshot of the patient's [facility] medical record.  For current drug dose and complete information and questions, call [phone] or In Basket pool, [facility] home infusion ([id number])  CSN Number:  [id number]

## 2018-07-30 ENCOUNTER — HEALTH MAINTENANCE LETTER (OUTPATIENT)
Age: 23
End: 2018-07-30

## 2018-07-31 ENCOUNTER — TELEPHONE (OUTPATIENT)
Dept: SURGERY | Facility: CLINIC | Age: 23
End: 2018-07-31

## 2018-07-31 ENCOUNTER — INFUSION THERAPY VISIT (OUTPATIENT)
Dept: INFUSION THERAPY | Facility: CLINIC | Age: 23
End: 2018-07-31
Attending: INTERNAL MEDICINE
Payer: COMMERCIAL

## 2018-07-31 VITALS
BODY MASS INDEX: 28.09 KG/M2 | TEMPERATURE: 98.3 F | OXYGEN SATURATION: 96 % | WEIGHT: 187.5 LBS | HEART RATE: 83 BPM | RESPIRATION RATE: 16 BRPM | DIASTOLIC BLOOD PRESSURE: 79 MMHG | SYSTOLIC BLOOD PRESSURE: 129 MMHG

## 2018-07-31 DIAGNOSIS — M31.19 ACQUIRED TTP (H): Primary | ICD-10-CM

## 2018-07-31 LAB
BASOPHILS # BLD AUTO: 0.1 10E9/L (ref 0–0.2)
BASOPHILS NFR BLD AUTO: 0.5 %
DIFFERENTIAL METHOD BLD: ABNORMAL
EOSINOPHIL # BLD AUTO: 0.1 10E9/L (ref 0–0.7)
EOSINOPHIL NFR BLD AUTO: 0.4 %
ERYTHROCYTE [DISTWIDTH] IN BLOOD BY AUTOMATED COUNT: 13.2 % (ref 10–15)
HCT VFR BLD AUTO: 38 % (ref 35–47)
HGB BLD-MCNC: 12.8 G/DL (ref 11.7–15.7)
IMM GRANULOCYTES # BLD: 0.6 10E9/L (ref 0–0.4)
IMM GRANULOCYTES NFR BLD: 4.9 %
LYMPHOCYTES # BLD AUTO: 0.5 10E9/L (ref 0.8–5.3)
LYMPHOCYTES NFR BLD AUTO: 4.3 %
MCH RBC QN AUTO: 31 PG (ref 26.5–33)
MCHC RBC AUTO-ENTMCNC: 33.7 G/DL (ref 31.5–36.5)
MCV RBC AUTO: 92 FL (ref 78–100)
MONOCYTES # BLD AUTO: 0.5 10E9/L (ref 0–1.3)
MONOCYTES NFR BLD AUTO: 4.3 %
NEUTROPHILS # BLD AUTO: 10 10E9/L (ref 1.6–8.3)
NEUTROPHILS NFR BLD AUTO: 85.6 %
NRBC # BLD AUTO: 0 10*3/UL
NRBC BLD AUTO-RTO: 0 /100
PLATELET # BLD AUTO: 242 10E9/L (ref 150–450)
RBC # BLD AUTO: 4.13 10E12/L (ref 3.8–5.2)
WBC # BLD AUTO: 11.7 10E9/L (ref 4–11)

## 2018-07-31 PROCEDURE — 96413 CHEMO IV INFUSION 1 HR: CPT

## 2018-07-31 PROCEDURE — 85025 COMPLETE CBC W/AUTO DIFF WBC: CPT | Performed by: INTERNAL MEDICINE

## 2018-07-31 PROCEDURE — 96415 CHEMO IV INFUSION ADDL HR: CPT

## 2018-07-31 PROCEDURE — 96375 TX/PRO/DX INJ NEW DRUG ADDON: CPT

## 2018-07-31 PROCEDURE — 25000128 H RX IP 250 OP 636: Mod: ZF | Performed by: INTERNAL MEDICINE

## 2018-07-31 PROCEDURE — 25000132 ZZH RX MED GY IP 250 OP 250 PS 637: Mod: ZF | Performed by: INTERNAL MEDICINE

## 2018-07-31 RX ORDER — OMEPRAZOLE 40 MG/1
40 CAPSULE, DELAYED RELEASE ORAL
COMMUNITY
Start: 2018-06-29 | End: 2018-08-07

## 2018-07-31 RX ORDER — FOLIC ACID 1 MG/1
1 TABLET ORAL
COMMUNITY
Start: 2018-07-10 | End: 2018-08-07

## 2018-07-31 RX ORDER — ACETAMINOPHEN 325 MG/1
650 TABLET ORAL ONCE
Status: CANCELLED
Start: 2018-07-31

## 2018-07-31 RX ORDER — HEPARIN SODIUM (PORCINE) LOCK FLUSH IV SOLN 100 UNIT/ML 100 UNIT/ML
5 SOLUTION INTRAVENOUS ONCE
Status: CANCELLED
Start: 2018-07-31 | End: 2018-07-31

## 2018-07-31 RX ORDER — METHYLPREDNISOLONE SODIUM SUCCINATE 125 MG/2ML
100 INJECTION, POWDER, LYOPHILIZED, FOR SOLUTION INTRAMUSCULAR; INTRAVENOUS ONCE
Status: CANCELLED | OUTPATIENT
Start: 2018-07-31

## 2018-07-31 RX ORDER — ACETAMINOPHEN 325 MG/1
650 TABLET ORAL ONCE
Status: COMPLETED | OUTPATIENT
Start: 2018-07-31 | End: 2018-07-31

## 2018-07-31 RX ORDER — DIPHENHYDRAMINE HCL 25 MG
50 CAPSULE ORAL ONCE
Status: CANCELLED
Start: 2018-07-31

## 2018-07-31 RX ORDER — METHYLPREDNISOLONE SODIUM SUCCINATE 125 MG/2ML
100 INJECTION, POWDER, LYOPHILIZED, FOR SOLUTION INTRAMUSCULAR; INTRAVENOUS ONCE
Status: COMPLETED | OUTPATIENT
Start: 2018-07-31 | End: 2018-07-31

## 2018-07-31 RX ORDER — HEPARIN SODIUM (PORCINE) LOCK FLUSH IV SOLN 100 UNIT/ML 100 UNIT/ML
5 SOLUTION INTRAVENOUS ONCE
Status: DISCONTINUED | OUTPATIENT
Start: 2018-07-31 | End: 2018-07-31 | Stop reason: HOSPADM

## 2018-07-31 RX ORDER — DIPHENHYDRAMINE HCL 25 MG
50 CAPSULE ORAL ONCE
Status: COMPLETED | OUTPATIENT
Start: 2018-07-31 | End: 2018-07-31

## 2018-07-31 RX ORDER — PREDNISONE 20 MG/1
20 TABLET ORAL
COMMUNITY
Start: 2018-06-26 | End: 2018-08-16

## 2018-07-31 RX ADMIN — DIPHENHYDRAMINE HYDROCHLORIDE 50 MG: 25 CAPSULE ORAL at 11:57

## 2018-07-31 RX ADMIN — RITUXIMAB 760 MG: 10 INJECTION, SOLUTION INTRAVENOUS at 12:17

## 2018-07-31 RX ADMIN — ACETAMINOPHEN 650 MG: 325 TABLET ORAL at 11:57

## 2018-07-31 RX ADMIN — METHYLPREDNISOLONE SODIUM SUCCINATE 100 MG: 125 INJECTION, POWDER, FOR SOLUTION INTRAMUSCULAR; INTRAVENOUS at 11:59

## 2018-07-31 NOTE — PROGRESS NOTES
Nursing Note  Zahida Grove [facility]:   Chief Complaint   Patient presents with     Remicade Infusion     During [facility]'s [facility] appointment, orders from Dr. Modi were completed.  Frequency: weekly and today is dose 2 of 4 total.    Progress note:  Patient identification verified by name and date of birth.  Assessment completed.  Vitals recorded in Doc Flowsheets.  Patient was provided with education regarding infusion and possible side effects.  Patient verbalized understanding.      needed: No  Premedications: administered per order.  Infusion Rates: infusion starts at 100 ml/hr, then increased by 100 ml/hr every 30 minutes to final rate of 400 ml/hr.  Approximate Infusion length:3 hours.   Labs: were drawn per orders.   Vascular access:PIV placed today. Tunneled line dressing change dose day.    Treatment Conditions:   ~~~ NOTE: If the patient answers yes to any of the questions below, hold the infusion and contact ordering provider or on-call provider.    1. Have you recently had an elevated temperature, fever, chills, productive cough, coughing for 3 weeks or longer or hemoptysis,  abnormal vital signs, night sweats,  chest pain or have you noticed a decrease in your appetite, unexplained weight loss or fatigue? No  2. Do you have any open wounds or new incisions? Yes, pilonidal cyst  3. Do you have any recent or upcoming hospitalizations, surgeries or dental procedures? Yes pt is planning to have surgery to cyst but has been told to wait until after rituxan therapy.   4. Do you currently have or recently have had any signs of illness or infection or are you on any antibiotics? No  5. Have you had any new, sudden or worsening abdominal pain? No  6. Have you or anyone in your household received a live vaccination in the past 4 weeks? Please note:  No live vaccines while on biologic/chemotherapy until 6 months after  the last treatment.  Patient can receive the flu vaccine (shot only) and the pneumovax.  It is optimal for the patient to get these vaccines mid cycle, but they can be given at any time as long as it is not on the day of the infusion. No  7. Have you recently been diagnosed with any new nervous system diseases (ie. Multiple sclerosis, Guillain [place], seizures, neurological changes) or cancer diagnosis? Are you on any form of radiation or chemotherapy? No  8. Are you pregnant or breast feeding or do you have plans of pregnancy in the future? No  9. Have you been having any signs of worsening depression or suicidal ideations?  (benlysta only) No  10. Have there been any other new onset medical symptoms? No    Patient tolerated infusion: well.    Drug Waste Record? Yes      Drug Name:solu-medrol  Dose: 100 mg  Route administered: IV  NDC #: 9664-0439-70  Amount of waste(mL):0.4 mls  Reason for waste: Single use vial     Discharge Plan:   Follow up plan of care with: ongoing infusions [facility]. and primary medical doctor.  Discharge instructions were reviewed with patient.  Patient/representative verbalized understanding of discharge instructions and all questions answered.  [facility] discharged [facility] in stable condition.    Ginny Wilkes RN  Administrations This Visit     acetaminophen (TYLENOL) tablet 650 mg     Admin Date Action Dose Route Administered By             07/31/2018 Given 650 mg Oral Ginny Wilkes RN                    diphenhydrAMINE (BENADRYL) capsule 50 mg     Admin Date Action Dose Route Administered By             07/31/2018 Given 50 mg Oral Ginny Wilkes RN                    methylPREDNISolone sodium succinate (solu-MEDROL) injection 100 mg     Admin Date Action Dose Route Administered By             07/31/2018 Given 100 mg Intravenous Ginny Wilkes RN                    riTUXimab (RITUXAN) 760 mg in sodium  chloride 0.9 % 760 mL non-oncology use     Admin Date Action Dose Route Administered By             07/31/2018 New Bag 760 mg Intravenous Ginny Wilkes, RN                              /64  Pulse 77  Temp 98.3  F (36.8  C) (Oral)  Resp 16  Wt 85 kg (187 lb 8 oz)  SpO2 96%  BMI 28.09 kg/m2

## 2018-07-31 NOTE — TELEPHONE ENCOUNTER
Martha was called back to discuss this patient. The direct phone number was left with a request for him to callback at his earliest convenience.

## 2018-07-31 NOTE — TELEPHONE ENCOUNTER
[facility] Call Center    Phone Message    May a detailed message be left on voicemail: no    Reason for Call: Other: Martha [facility] Rn from [facility] is calling to get pt in for a pilonidal cyst.  The first opening I had was the end of August and he asked to speak with a Rn about needing sooner.  Martha the Rn can be reached at [phone] or page him at [phone]     Action Taken: Message routed to:  [facility] ([facility] Colon Rectal

## 2018-07-31 NOTE — TELEPHONE ENCOUNTER
Patient was called back to discuss the below symptoms. Patient denies any signs or symptoms of infection. Patient states she has been in and out of the hospital so she is aware it is a pilonidal cyst and has been closely monitored. Patient was scheduled for her appointment on August 9th at 8:15 am. Patient verified appointment date, time, and location.

## 2018-07-31 NOTE — PATIENT INSTRUCTIONS
Rituximab Solution for injection  What is this medicine?  RITUXIMAB (ri TUX i mab) is a monoclonal antibody. This medicine changes the way the body's immune system works. It is used commonly to treat non-Hodgkin lymphoma and other conditions. In cancer cells, this drug targets a specific protein within cancer cells and stops the cancer cells from growing. It is also used to treat rheumatoid arthritis (RA). In RA, this medicine slows the inflammatory process and help reduce joint pain and swelling. This medicine is often used with other cancer or arthritis medications.  This medicine may be used for other purposes; ask your health care provider or pharmacist if you have questions.  What should I tell my health care provider before I take this medicine?  They need to know if you have any of these conditions:    blood disorders    heart disease    history of hepatitis B    infection (especially a virus infection such as chickenpox, cold sores, or herpes)    irregular heartbeat    kidney disease    lung or breathing disease, like asthma    lupus    an unusual or allergic reaction to rituximab, mouse proteins, other medicines, foods, dyes, or preservatives    pregnant or trying to get pregnant    breast-feeding  How should I use this medicine?  This medicine is for infusion into a vein. It is administered in a hospital or clinic by a specially trained health care professional.  A special MedGuide will be given to you by the pharmacist with each prescription and refill. Be sure to read this information carefully each time.  Talk to your pediatrician regarding the use of this medicine in children. This medicine is not approved for use in children.  Overdosage: If you think you have taken too much of this medicine contact a poison control center or emergency room at once.  NOTE: This medicine is only for you. Do not share this medicine with others.  What if I miss a dose?  It is important not to miss a dose. Call your  doctor or health care professional if you are unable to keep an appointment.  What may interact with this medicine?    cisplatin    medicines for blood pressure    some other medicines for arthritis    vaccines  This list may not describe all possible interactions. Give your health care provider a list of all the medicines, herbs, non-prescription drugs, or dietary supplements you use. Also tell them if you smoke, drink alcohol, or use illegal drugs. Some items may interact with your medicine.  What should I watch for while using this medicine?  Report any side effects that you notice during your treatment right away, such as changes in your breathing, fever, chills, dizziness or lightheadedness. These effects are more common with the first dose.  Visit your prescriber or health care professional for checks on your progress. You will need to have regular blood work. Report any other side effects. The side effects of this medicine can continue after you finish your treatment. Continue your course of treatment even though you feel ill unless your doctor tells you to stop.  Call your doctor or health care professional for advice if you get a fever, chills or sore throat, or other symptoms of a cold or flu. Do not treat yourself. This drug decreases your body's ability to fight infections. Try to avoid being around people who are sick.  This medicine may increase your risk to bruise or bleed. Call your doctor or health care professional if you notice any unusual bleeding.  Be careful brushing and flossing your teeth or using a toothpick because you may get an infection or bleed more easily. If you have any dental work done, tell your dentist you are receiving this medicine.  Avoid taking products that contain aspirin, acetaminophen, ibuprofen, naproxen, or ketoprofen unless instructed by your doctor. These medicines may hide a fever.  Do not become pregnant while taking this medicine. Women should inform their doctor if  they wish to become pregnant or think they might be pregnant. There is a potential for serious side effects to an unborn child. Talk to your health care professional or pharmacist for more information. Do not breast-feed an infant while taking this medicine.  What side effects may I notice from receiving this medicine?  Side effects that you should report to your doctor or health care professional as soon as possible:    allergic reactions like skin rash, itching or hives, swelling of the face, lips, or tongue    low blood counts - this medicine may decrease the number of white blood cells, red blood cells and platelets. You may be at increased risk for infections and bleeding.    signs of infection - fever or chills, cough, sore throat, pain or difficulty passing urine    signs of decreased platelets or bleeding - bruising, pinpoint red spots on the skin, black, tarry stools, blood in the urine    signs of decreased red blood cells - unusually weak or tired, fainting spells, lightheadedness    breathing problems    confused, not responsive    chest pain    fast, irregular heartbeat    feeling faint or lightheaded, falls    mouth sores    redness, blistering, peeling or loosening of the skin, including inside the mouth    stomach pain    swelling of the ankles, feet, or hands    trouble passing urine or change in the amount of urine  Side effects that usually do not require medical attention (report to your doctor or other health care professional if they continue or are bothersome):    anxiety    headache    loss of appetite    muscle aches    nausea    night sweats  This list may not describe all possible side effects. Call your doctor for medical advice about side effects. You may report side effects to FDA at 4-185-KNC-1030.  Where should I keep my medicine?  This drug is given in a hospital or clinic and will not be stored at home.  NOTE:This sheet is a summary. It may not cover all possible information. If you  have questions about this medicine, talk to your doctor, pharmacist, or health care provider. Copyright  2016 Gold Standard

## 2018-07-31 NOTE — MR AVS SNAPSHOT
After Visit Summary   7/31/2018    Zaihda Grove    MRN: [id number]           Patient Information     Date Of Birth          1995        Visit Information        Provider Department      7/31/2018 11:00 AM UC 44 ATC; UC SPEC INFUSION [facility] Specialty and Procedure        Today's Diagnoses     Acquired TTP (H)    -  1      Care Instructions      Rituximab Solution for injection  What is this medicine?  RITUXIMAB (ri TUX i mab) is a monoclonal antibody. This medicine changes the way the body's immune system works. It is used commonly to treat non-Hodgkin lymphoma and other conditions. In cancer cells, this drug targets a specific protein within cancer cells and stops the cancer cells from growing. It is also used to treat rheumatoid arthritis (RA). In RA, this medicine slows the inflammatory process and help reduce joint pain and swelling. This medicine is often used with other cancer or arthritis medications.  This medicine may be used for other purposes; ask your health care provider or pharmacist if you have questions.  What should I tell my health care provider before I take this medicine?  They need to know if you have any of these conditions:    blood disorders    heart disease    history of hepatitis B    infection (especially a virus infection such as chickenpox, cold sores, or herpes)    irregular heartbeat    kidney disease    lung or breathing disease, like asthma    lupus    an unusual or allergic reaction to rituximab, mouse proteins, other medicines, foods, dyes, or preservatives    pregnant or trying to get pregnant    breast-feeding  How should I use this medicine?  This medicine is for infusion into a vein. It is administered in a hospital or clinic by a specially trained health care professional.  A special MedGuide will be given to you by the pharmacist with each prescription and refill. Be sure to read this information carefully each time.  Talk to  your pediatrician regarding the use of this medicine in children. This medicine is not approved for use in children.  Overdosage: If you think you have taken too much of this medicine contact a poison control center or emergency room at once.  NOTE: This medicine is only for you. Do not share this medicine with others.  What if I miss a dose?  It is important not to miss a dose. Call your doctor or health care professional if you are unable to keep an appointment.  What may interact with this medicine?    cisplatin    medicines for blood pressure    some other medicines for arthritis    vaccines  This list may not describe all possible interactions. Give your health care provider a list of all the medicines, herbs, non-prescription drugs, or dietary supplements you use. Also tell them if you smoke, drink alcohol, or use illegal drugs. Some items may interact with your medicine.  What should I watch for while using this medicine?  Report any side effects that you notice during your treatment right away, such as changes in your breathing, fever, chills, dizziness or lightheadedness. These effects are more common with the first dose.  Visit your prescriber or health care professional for checks on your progress. You will need to have regular blood work. Report any other side effects. The side effects of this medicine can continue after you finish your treatment. Continue your course of treatment even though you feel ill unless your doctor tells you to stop.  Call your doctor or health care professional for advice if you get a fever, chills or sore throat, or other symptoms of a cold or flu. Do not treat yourself. This drug decreases your body's ability to fight infections. Try to avoid being around people who are sick.  This medicine may increase your risk to bruise or bleed. Call your doctor or health care professional if you notice any unusual bleeding.  Be careful brushing and flossing your teeth or using a  toothpick because you may get an infection or bleed more easily. If you have any dental work done, tell your dentist you are receiving this medicine.  Avoid taking products that contain aspirin, acetaminophen, ibuprofen, naproxen, or ketoprofen unless instructed by your doctor. These medicines may hide a fever.  Do not become pregnant while taking this medicine. Women should inform their doctor if they wish to become pregnant or think they might be pregnant. There is a potential for serious side effects to an unborn child. Talk to your health care professional or pharmacist for more information. Do not breast-feed an infant while taking this medicine.  What side effects may I notice from receiving this medicine?  Side effects that you should report to your doctor or health care professional as soon as possible:    allergic reactions like skin rash, itching or hives, swelling of the face, lips, or tongue    low blood counts - this medicine may decrease the number of white blood cells, red blood cells and platelets. You may be at increased risk for infections and bleeding.    signs of infection - fever or chills, cough, sore throat, pain or difficulty passing urine    signs of decreased platelets or bleeding - bruising, pinpoint red spots on the skin, black, tarry stools, blood in the urine    signs of decreased red blood cells - unusually weak or tired, fainting spells, lightheadedness    breathing problems    confused, not responsive    chest pain    fast, irregular heartbeat    feeling faint or lightheaded, falls    mouth sores    redness, blistering, peeling or loosening of the skin, including inside the mouth    stomach pain    swelling of the ankles, feet, or hands    trouble passing urine or change in the amount of urine  Side effects that usually do not require medical attention (report to your doctor or other health care professional if they continue or are bothersome):    anxiety    headache    loss of  appetite    muscle aches    nausea    night sweats  This list may not describe all possible side effects. Call your doctor for medical advice about side effects. You may report side effects to FDA at 7-830-WQR-8380.  Where should I keep my medicine?  This drug is given in a hospital or clinic and will not be stored at home.  NOTE:This sheet is a summary. It may not cover all possible information. If you have questions about this medicine, talk to your doctor, pharmacist, or health care provider. Copyright  2016 Gold Standard                Follow-ups after your visit        Your next 10 appointments already scheduled     Aug 07, 2018  8:00 AM CDT   Infusion 360 with UC SPEC INFUSION, UC 45 ATC   [facility] [place] Specialty [facility] Procedure ([facility])    [address]  [address]  [place] [facility] [address] [phone]            Aug 09, 2018 12:00 PM CDT   RETURN CLOTTING DISORDER with Damián Modi MD   [facility] ([facility])    [facility]  [address]  [place] [facility] [address] [phone]            Aug 14, 2018  9:00 AM CDT   Infusion 360 with UC SPEC INFUSION, UC 46 ATC   [facility] Specialty and Procedure ([facility])    [address]  [address]  [facility] [address] [phone]              Who to contact     If you have questions or need follow up information about today's clinic visit or your schedule please contact [facility] SPECIALTY AND PROCEDURE directly at [phone].  Normal or non-critical lab and imaging results will be communicated to you by MyChart, letter or phone within 4 business days after the clinic has received the results. If you do not hear from us within 7 days, please contact the clinic through MyChart or phone. If you have a critical or abnormal lab result, we  will notify you by phone as soon as possible.  Submit refill requests through [employer] or call your pharmacy and they will forward the refill request to us. Please allow 3 business days for your refill to be completed.          Additional Information About Your Visit        [employer] Information     [employer] gives you secure access to your electronic health record. If you see a primary care provider, you can also send messages to your care team and make appointments. If you have questions, please call your primary care clinic.  If you do not have a primary care provider, please call [phone] and they will assist you.        Care EveryWhere ID     This is your Care EveryWhere ID. This could be used by other organizations to access your [place] medical records  [id number]        Your Vitals Were     Pulse Temperature Respirations Pulse Oximetry BMI (Body Mass Index)       77 98.3  F (36.8  C) (Oral) 16 96% 28.09 kg/m2        Blood Pressure from Last 3 Encounters:   07/31/18 119/64   07/23/18 128/56   07/17/18 116/71    Weight from Last 3 Encounters:   07/31/18 85 kg (187 lb 8 oz)   07/23/18 83.7 kg (184 lb 9.6 oz)   07/17/18 82.5 kg (181 lb 14.4 oz)              We Performed the Following     *CBC with platelets differential        Primary Care Provider Fax #    Physician No Ref-Primary [phone]       No address on file        Equal Access to Services     TIEN AGUILAR : Hadii garrett ku hadasho Soinessa, waaxda luqadaha, qaybta kaalmada angel, mickey santana . So [facility] [phone].    ATENCIÓN: Si habla español, tiene a pendleton disposición servicios gratuitos de asistencia lingüística. Llame al [phone].    We comply with applicable federal civil rights laws and [place] laws. We do not discriminate on the basis of race, color, national origin, age, disability, sex, sexual orientation, or gender identity.            Thank you!     Thank you for choosing [facility]  for your care. Our goal is always to provide you with excellent care. Hearing back from our patients is one way we can continue to improve our services. Please take a few minutes to complete the written survey that you may receive in the mail after your visit with us. Thank you!             Your Updated Medication List - Protect others around you: Learn how to safely use, store and throw away your medicines at www.disposemymeds.org.          This list is accurate as of 7/31/18 12:21 PM.  Always use your most recent med list.                   Brand Name Dispense Instructions for use Diagnosis    folic acid 1 MG tablet    FOLVITE     Take 1 mg by mouth        omeprazole 40 MG capsule    priLOSEC     Take 40 mg by mouth        predniSONE 20 MG tablet    DELTASONE     Take 20 mg by mouth

## 2018-08-03 ENCOUNTER — INFUSION THERAPY VISIT (OUTPATIENT)
Dept: INFUSION THERAPY | Facility: CLINIC | Age: 23
End: 2018-08-03
Payer: COMMERCIAL

## 2018-08-03 DIAGNOSIS — M31.19 ACQUIRED TTP (H): ICD-10-CM

## 2018-08-03 LAB
BASOPHILS # BLD AUTO: 0.1 10E9/L (ref 0–0.2)
BASOPHILS NFR BLD AUTO: 0.5 %
DIFFERENTIAL METHOD BLD: ABNORMAL
EOSINOPHIL # BLD AUTO: 0 10E9/L (ref 0–0.7)
EOSINOPHIL NFR BLD AUTO: 0.2 %
ERYTHROCYTE [DISTWIDTH] IN BLOOD BY AUTOMATED COUNT: 13.2 % (ref 10–15)
HCT VFR BLD AUTO: 37.8 % (ref 35–47)
HGB BLD-MCNC: 12.7 G/DL (ref 11.7–15.7)
IMM GRANULOCYTES # BLD: 0.5 10E9/L (ref 0–0.4)
IMM GRANULOCYTES NFR BLD: 3.8 %
LYMPHOCYTES # BLD AUTO: 0.4 10E9/L (ref 0.8–5.3)
LYMPHOCYTES NFR BLD AUTO: 3.5 %
MCH RBC QN AUTO: 31 PG (ref 26.5–33)
MCHC RBC AUTO-ENTMCNC: 33.6 G/DL (ref 31.5–36.5)
MCV RBC AUTO: 92 FL (ref 78–100)
MONOCYTES # BLD AUTO: 0.6 10E9/L (ref 0–1.3)
MONOCYTES NFR BLD AUTO: 4.6 %
NEUTROPHILS # BLD AUTO: 10.7 10E9/L (ref 1.6–8.3)
NEUTROPHILS NFR BLD AUTO: 87.4 %
PLATELET # BLD AUTO: 264 10E9/L (ref 150–450)
RBC # BLD AUTO: 4.1 10E12/L (ref 3.8–5.2)
WBC # BLD AUTO: 12.3 10E9/L (ref 4–11)

## 2018-08-03 PROCEDURE — 36592 COLLECT BLOOD FROM PICC: CPT | Performed by: INTERNAL MEDICINE

## 2018-08-03 PROCEDURE — 85025 COMPLETE CBC W/AUTO DIFF WBC: CPT | Performed by: INTERNAL MEDICINE

## 2018-08-03 PROCEDURE — 99207 ZZC NO CHARGE LOS: CPT

## 2018-08-03 RX ORDER — HEPARIN SODIUM (PORCINE) LOCK FLUSH IV SOLN 100 UNIT/ML 100 UNIT/ML
5 SOLUTION INTRAVENOUS ONCE
Status: COMPLETED | OUTPATIENT
Start: 2018-08-03 | End: 2018-08-03

## 2018-08-03 RX ADMIN — HEPARIN SODIUM (PORCINE) LOCK FLUSH IV SOLN 100 UNIT/ML 5 ML: 100 SOLUTION at 15:39

## 2018-08-03 NOTE — PROGRESS NOTES
Infusion Nursing Note:  Zahida Hazelpepper presents today for CVC flush, CBC drawn and dressing change.    Patient seen by provider today: No   present during visit today: Not Applicable.    Note: N/A.    Intravenous Access:  Paiz.  Treatment Conditions:  Not Applicable.      Post Infusion Assessment:  Site patent and intact, free from redness, edema or discomfort.  No evidence of extravasations.    Discharge Plan:   Discharge instructions reviewed with: Patient.  Patient and/or family verbalized understanding of discharge instructions and all questions answered.  Patient discharged in stable condition accompanied by: self.  Departure Mode: Ambulatory.    Kayleigh Roberson RN

## 2018-08-03 NOTE — MR AVS SNAPSHOT
After Visit Summary   8/3/2018    Zahida Grove    MRN: [id number]           Patient Information     Date Of Birth          1995        Visit Information        Provider Department      8/3/2018 3:00 PM BAY 2 INFUSION [facility]        Today's Diagnoses     Acquired TTP (H)           Follow-ups after your visit        Your next 10 appointments already scheduled     Aug 07, 2018  8:00 AM CDT   Infusion 360 with UC SPEC INFUSION, UC 45 ATC   [facility] Specialty and Procedure ([facility])    [address]  [address]  [place] [address] [phone]            Aug 07, 2018  1:00 PM CDT   RETURN CLOTTING DISORDER with Damián Modi MD   [facility] ([facility])    [address]  [address]  [place] [address] [phone]            Aug 09, 2018  8:15 AM CDT   (Arrive by 8:00 AM)   New Patient Visit with JOHN Sharpe [facility] Rectal [facility] ([facility])    [address]  4[address]  [place] [address] [phone]            Aug 15, 2018 11:00 AM CDT   Infusion 360 with UC SPEC INFUSION, UC 44 ATC   [facility] Specialty and Procedure ([facility])    [address]  [address]  [place] [id number] [phone]              Who to contact     If you have questions or need follow up information about today's clinic visit or your schedule please contact [facility] directly at [phone].  Normal or non-critical lab and imaging results will be communicated to you by MyChart, letter or phone within 4 business days after the clinic has received the results. If you do not hear from us within 7 days, please contact the clinic through MyChart or phone. If you have a critical or  abnormal lab result, we will notify you by phone as soon as possible.  Submit refill requests through [employer] or call your pharmacy and they will forward the refill request to us. Please allow 3 business days for your refill to be completed.          Additional Information About Your Visit        [employer] Information     [employer] gives you secure access to your electronic health record. If you see a primary care provider, you can also send messages to your care team and make appointments. If you have questions, please call your primary care clinic.  If you do not have a primary care provider, please call [phone] and they will assist you.      [employer] is an electronic gateway that provides easy, online access to your medical records. With [employer], you can request a clinic appointment, read your test results, renew a prescription or communicate with your care team.     To access your existing account, please contact your [facility] or call [phone] for assistance.        Care EveryWhere ID     This is your Care EveryWhere ID. This could be used by other organizations to access your [place] medical records  [id number]         Blood Pressure from Last 3 Encounters:   07/31/18 129/79   07/23/18 128/56   07/17/18 116/71    Weight from Last 3 Encounters:   07/31/18 85 kg (187 lb 8 oz)   07/23/18 83.7 kg (184 lb 9.6 oz)   07/17/18 82.5 kg (181 lb 14.4 oz)              We Performed the Following     *CBC with platelets differential        Primary Care Provider Fax #    Physician No Ref-Primary [phone]       No address on file        Equal Access to Services     KD AGUILAR : Hadii aad ku hadasho Soomaali, waaxda luqadaha, qaybta kaalmada adeegyada, waxay tereso santana . So [facility] [phone].    ATENCIÓN: Si habla español, tiene a pendleton disposición servicios gratuitos de asistencia lingüística. Llame al [phone].    We comply with applicable federal civil  rights laws and [place] laws. We do not discriminate on the basis of race, color, national origin, age, disability, sex, sexual orientation, or gender identity.            Thank you!     Thank you for choosing [facility]  for your care. Our goal is always to provide you with excellent care. Hearing back from our patients is one way we can continue to improve our services. Please take a few minutes to complete the written survey that you may receive in the mail after your visit with us. Thank you!             Your Updated Medication List - Protect others around you: Learn how to safely use, store and throw away your medicines at www.disposemymeds.org.          This list is accurate as of 8/3/18  4:30 PM.  Always use your most recent med list.                   Brand Name Dispense Instructions for use Diagnosis    folic acid 1 MG tablet    FOLVITE     Take 1 mg by mouth        omeprazole 40 MG capsule    priLOSEC     Take 40 mg by mouth        predniSONE 20 MG tablet    DELTASONE     Take 20 mg by mouth

## 2018-08-07 ENCOUNTER — TELEPHONE (OUTPATIENT)
Dept: INTERVENTIONAL RADIOLOGY/VASCULAR | Facility: CLINIC | Age: 23
End: 2018-08-07

## 2018-08-07 ENCOUNTER — INFUSION THERAPY VISIT (OUTPATIENT)
Dept: INFUSION THERAPY | Facility: CLINIC | Age: 23
End: 2018-08-07
Attending: INTERNAL MEDICINE
Payer: COMMERCIAL

## 2018-08-07 ENCOUNTER — OFFICE VISIT (OUTPATIENT)
Dept: HEMATOLOGY | Facility: CLINIC | Age: 23
End: 2018-08-07
Attending: INTERNAL MEDICINE
Payer: COMMERCIAL

## 2018-08-07 VITALS
HEART RATE: 102 BPM | WEIGHT: 189.7 LBS | OXYGEN SATURATION: 97 % | RESPIRATION RATE: 16 BRPM | SYSTOLIC BLOOD PRESSURE: 119 MMHG | BODY MASS INDEX: 28.42 KG/M2 | DIASTOLIC BLOOD PRESSURE: 67 MMHG | TEMPERATURE: 98.4 F

## 2018-08-07 VITALS
WEIGHT: 191.1 LBS | HEIGHT: 69 IN | DIASTOLIC BLOOD PRESSURE: 80 MMHG | SYSTOLIC BLOOD PRESSURE: 130 MMHG | BODY MASS INDEX: 28.3 KG/M2 | TEMPERATURE: 97.6 F | HEART RATE: 77 BPM | OXYGEN SATURATION: 99 %

## 2018-08-07 DIAGNOSIS — M31.19 ACQUIRED TTP (H): Primary | ICD-10-CM

## 2018-08-07 LAB
BASOPHILS # BLD AUTO: 0.1 10E9/L (ref 0–0.2)
BASOPHILS NFR BLD AUTO: 0.8 %
DIFFERENTIAL METHOD BLD: NORMAL
EOSINOPHIL # BLD AUTO: 0.1 10E9/L (ref 0–0.7)
EOSINOPHIL NFR BLD AUTO: 1.6 %
ERYTHROCYTE [DISTWIDTH] IN BLOOD BY AUTOMATED COUNT: 13.2 % (ref 10–15)
HCT VFR BLD AUTO: 36.8 % (ref 35–47)
HGB BLD-MCNC: 12.2 G/DL (ref 11.7–15.7)
IMM GRANULOCYTES # BLD: 0.3 10E9/L (ref 0–0.4)
IMM GRANULOCYTES NFR BLD: 4.4 %
LYMPHOCYTES # BLD AUTO: 0.8 10E9/L (ref 0.8–5.3)
LYMPHOCYTES NFR BLD AUTO: 11.3 %
MCH RBC QN AUTO: 30.3 PG (ref 26.5–33)
MCHC RBC AUTO-ENTMCNC: 33.2 G/DL (ref 31.5–36.5)
MCV RBC AUTO: 91 FL (ref 78–100)
MONOCYTES # BLD AUTO: 0.9 10E9/L (ref 0–1.3)
MONOCYTES NFR BLD AUTO: 11.4 %
NEUTROPHILS # BLD AUTO: 5.2 10E9/L (ref 1.6–8.3)
NEUTROPHILS NFR BLD AUTO: 70.5 %
NRBC # BLD AUTO: 0 10*3/UL
NRBC BLD AUTO-RTO: 0 /100
PLATELET # BLD AUTO: 247 10E9/L (ref 150–450)
RBC # BLD AUTO: 4.03 10E12/L (ref 3.8–5.2)
WBC # BLD AUTO: 7.4 10E9/L (ref 4–11)

## 2018-08-07 PROCEDURE — 96415 CHEMO IV INFUSION ADDL HR: CPT

## 2018-08-07 PROCEDURE — 36589 REMOVAL TUNNELED CV CATH: CPT | Performed by: INTERNAL MEDICINE

## 2018-08-07 PROCEDURE — 25000132 ZZH RX MED GY IP 250 OP 250 PS 637: Mod: ZF | Performed by: INTERNAL MEDICINE

## 2018-08-07 PROCEDURE — 85397 CLOTTING FUNCT ACTIVITY: CPT | Performed by: INTERNAL MEDICINE

## 2018-08-07 PROCEDURE — 96413 CHEMO IV INFUSION 1 HR: CPT

## 2018-08-07 PROCEDURE — 96375 TX/PRO/DX INJ NEW DRUG ADDON: CPT

## 2018-08-07 PROCEDURE — 85025 COMPLETE CBC W/AUTO DIFF WBC: CPT | Performed by: INTERNAL MEDICINE

## 2018-08-07 PROCEDURE — 99214 OFFICE O/P EST MOD 30 MIN: CPT | Performed by: INTERNAL MEDICINE

## 2018-08-07 PROCEDURE — 25000128 H RX IP 250 OP 636: Mod: ZF | Performed by: INTERNAL MEDICINE

## 2018-08-07 RX ORDER — DIPHENHYDRAMINE HCL 25 MG
50 CAPSULE ORAL ONCE
Status: CANCELLED
Start: 2018-08-07

## 2018-08-07 RX ORDER — ACETAMINOPHEN 325 MG/1
650 TABLET ORAL ONCE
Status: COMPLETED | OUTPATIENT
Start: 2018-08-07 | End: 2018-08-07

## 2018-08-07 RX ORDER — HEPARIN SODIUM (PORCINE) LOCK FLUSH IV SOLN 100 UNIT/ML 100 UNIT/ML
5 SOLUTION INTRAVENOUS ONCE
Status: CANCELLED
Start: 2018-08-07 | End: 2018-08-07

## 2018-08-07 RX ORDER — METHYLPREDNISOLONE SODIUM SUCCINATE 125 MG/2ML
100 INJECTION, POWDER, LYOPHILIZED, FOR SOLUTION INTRAMUSCULAR; INTRAVENOUS ONCE
Status: CANCELLED | OUTPATIENT
Start: 2018-08-07

## 2018-08-07 RX ORDER — METHYLPREDNISOLONE SODIUM SUCCINATE 125 MG/2ML
100 INJECTION, POWDER, LYOPHILIZED, FOR SOLUTION INTRAMUSCULAR; INTRAVENOUS ONCE
Status: COMPLETED | OUTPATIENT
Start: 2018-08-07 | End: 2018-08-07

## 2018-08-07 RX ORDER — ACETAMINOPHEN 325 MG/1
650 TABLET ORAL ONCE
Status: CANCELLED
Start: 2018-08-07

## 2018-08-07 RX ORDER — HEPARIN SODIUM (PORCINE) LOCK FLUSH IV SOLN 100 UNIT/ML 100 UNIT/ML
5 SOLUTION INTRAVENOUS ONCE
Status: COMPLETED | OUTPATIENT
Start: 2018-08-07 | End: 2018-08-07

## 2018-08-07 RX ORDER — DIPHENHYDRAMINE HCL 25 MG
50 CAPSULE ORAL ONCE
Status: COMPLETED | OUTPATIENT
Start: 2018-08-07 | End: 2018-08-07

## 2018-08-07 RX ADMIN — DIPHENHYDRAMINE HYDROCHLORIDE 50 MG: 25 CAPSULE ORAL at 08:38

## 2018-08-07 RX ADMIN — RITUXIMAB 800 MG: 10 INJECTION, SOLUTION INTRAVENOUS at 09:03

## 2018-08-07 RX ADMIN — SODIUM CHLORIDE, PRESERVATIVE FREE 5 ML: 5 INJECTION INTRAVENOUS at 11:53

## 2018-08-07 RX ADMIN — METHYLPREDNISOLONE SODIUM SUCCINATE 100 MG: 125 INJECTION, POWDER, FOR SOLUTION INTRAMUSCULAR; INTRAVENOUS at 08:43

## 2018-08-07 RX ADMIN — ACETAMINOPHEN 650 MG: 325 TABLET ORAL at 08:37

## 2018-08-07 ASSESSMENT — PAIN SCALES - GENERAL: PAINLEVEL: NO PAIN (0)

## 2018-08-07 NOTE — PATIENT INSTRUCTIONS
Dear Zahida Grove    Thank you for choosing [facility] ([facility]) for your infusion.  The following information is a summary of our appointment as well as important reminders.      EDUCATION POST BIOLOGICAL/CHEMOTHERAPY INFUSION  Call the triage nurse at your clinic or seek medical attention if you have chills and/or temperature greater than or equal to 100.5, uncontrolled nausea/vomiting, diarrhea, constipation, dizziness, shortness of breath, chest pain, heart palpitations, weakness or any other new or concerning symptoms, questions or concerns.  You can not have any live virus vaccines prior to or during treatment or up to 6 months post infusion.  If you have an upcoming surgery, medical procedure or dental procedure during treatment, this should be discussed with your ordering physician and your surgeon/dentist.  If you are having any concerning symptom, if you are unsure if you should get your next infusion or wish to speak to a provider before your next infusion, please call your care coordinator or triage nurse at your clinic to notify them so we can adequately serve you.        Additional information: you received an infusion of Rituxan 800 mg via IV today, along with Tylenol 650 mg, Benadryl 50 mg, and Solumedrol 100 mg via IV as pre-medications.       We look forward in seeing you on your next appointment here at [facility].  Please don t hesitate to call us at [phone] to reschedule any of your appointments or to speak with one of the [facility] registered nurses.  It was a pleasure taking care of you today.    Sincerely,    [facility]  [facility]  [address] [place] [address]  [address]  Phone:  [phone]

## 2018-08-07 NOTE — MR AVS SNAPSHOT
After Visit Summary   8/7/2018    Zahida Grove    MRN: [id number]           Patient Information     Date Of Birth          1995        Visit Information        Provider Department      8/7/2018 1:00 PM Damián Modi MD [facility]        Care Instructions    1. Continue Prednisone 10mg until Rod Aug 12.  2. Sometimes people feel ill after stopping Prednisone so please call us if this happens and it is okay to go back on 5mg of Prednisone each day  3. We will arrange follow up in about 2-3 months.  4. Think about whether you want another [id number] test at your next visit and consider coming a week ahead of time for that test.            Follow-ups after your visit        Follow-up notes from your care team     Return in about 3 months (around 11/7/2018).      Your next 10 appointments already scheduled     Aug 09, 2018  8:15 AM CDT   (Arrive by 8:00 AM)   New Patient Visit with JOHN Sharpe [facility]al [facility] ([facility] [place])    [address] [id number] [phone]            Aug 09, 2018 11:30 AM CDT   IR CVC TUNNEL REMOVAL LEFT with UUIR6   [facility], [place], Interventional Radiology ([facility] [place])    [address] [id number] [phone]           1. Your doctor will need to do a history and physical within 7 days before this procedure. 2. Your doctor will which medications should not be taken the morning of the exam. 3. Laboratory tests are to be obtained by your doctor prior to the exam (Basic Metabolic Panel, CBCP, PTT and INR) (No labs needed if you are having a tunneled catheter exchange or removal) 4. If you have allergies to x-ray contrast or iodine, contact your doctor or a Radiology nurse prior to the exam day for instructions. 5. Someone will need to drive you to and from the  [facility]. 6. If you are or may be pregnant, contact your doctor or a Radiology nurse prior to the day of the exam. 7. If you have diabetes, check with your doctor or a Radiology nurse to see if your insulin needs to be adjusted for the exam. 8. If you are taking a medication called Glucophage or Glucovance; these medications need to be held the day of the exam and for approximately 48 hours following. A blood sample must be drawn so your creatinine level can be checked before resuming this medication. 9. If you are taking Coumadin (to thin you blood) please contact your doctor or a Radiology nurse at least 3 days before the exam for special instructions. 10. You should not have received contrast within 48 hours of this exam. 11. The day before your exam you may eat your regular diet and are encouraged to drink at least 2 quarts of clear liquids. Drink no alcoholic beverages for 24 hours prior to the exam. 12. If you have a colostomy you will need to irrigate it with tap water at 8PM the evening before and again at 6AM the morning of the exam. 13. Do not smoke for 24 hours prior to the procedure. 14. Birth to 4 years: - Breast feeding must be stopped 4 hours prior to exam - Solid food or formula must be stopped 6 hours prior to exam - Tube feedings must be stopped 6 hours prior to exam 15. 4-10 years old: - Nothing to eat or drink 6 hours prior to exam 16. 10+ years old: - Nothing to eat or drink 8 hours prior to exam 17. The morning of the exam you may brush your teeth and take medications as directed with a sip of water. 18. When discharged, you cannot drive until morning, and an adult must be with you until then. You should stay in the [facility] overnight. 19. Bring a list of all drugs you are taking; include supplements and over-the-counter medications. Wear comfortable clothes and leave your valuables at home.            Aug 15, 2018 11:00 AM CDT   Infusion 360 with ALANA SPEC INFUSION,  44 ATC   [facility] Special[facility] ([facility] [place])    [address]  [address]  [facility] [address] [phone]              Future tests that were ordered for you today     Open Future Orders        Priority Expected Expires Ordered    IR CVC Tunnel Removal Left ASAP 8/7/2018 8/7/2019 8/7/2018            Who to contact     If you have questions or need follow up information about today's clinic visit or your schedule please contact [facility] directly at [phone].  Normal or non-critical lab and imaging results will be communicated to you by [employer], letter or phone within 4 business days after the clinic has received the results. If you do not hear from us within 7 days, please contact the clinic through [employer] or phone. If you have a critical or abnormal lab result, we will notify you by phone as soon as possible.  Submit refill requests through [employer] or call your pharmacy and they will forward the refill request to us. Please allow 3 business days for your refill to be completed.          Additional Information About Your Visit        [employer] Information     [employer] gives you secure access to your electronic health record. If you see a primary care provider, you can also send messages to your care team and make appointments. If you have questions, please call your primary care clinic.  If you do not have a primary care provider, please call [phone] and they will assist you.        Care EveryWhere ID     This is your Care EveryWhere ID. This could be used by other organizations to access your [place] medical records  [id number]        Your Vitals Were     Pulse Temperature Height Pulse Oximetry BMI (Body Mass Index)       77 97.6  F (36.4  C) (Oral) 1.753 m (5' 9\") 99% 28.22 kg/m2        Blood Pressure from Last 3 Encounters:   08/07/18 130/80   08/07/18 119/67   07/31/18 129/79    Weight from Last 3 Encounters: "   08/07/18 86.7 kg (191 lb 1.6 oz)   08/07/18 86 kg (189 lb 11.2 oz)   07/31/18 85 kg (187 lb 8 oz)              Today, you had the following     No orders found for display       Primary Care Provider Fax #    Physician No Ref-Primary [phone]       No address on file        Equal Access to Services     [facility] : Hadii aad ku hadasho Soomaali, waaxda luqadaha, qaybta kaalmada adelaurayada, mickey dixonsalnancy santana . So [facility] [phone].    ATENCIÓN: Si habla español, tiene a pendleton disposición servicios gratuitos de asistencia lingüística. Llame al [phone].    We comply with applicable federal civil rights laws and [place] laws. We do not discriminate on the basis of race, color, national origin, age, disability, sex, sexual orientation, or gender identity.            Thank you!     Thank you for choosing [facility]  for your care. Our goal is always to provide you with excellent care. Hearing back from our patients is one way we can continue to improve our services. Please take a few minutes to complete the written survey that you may receive in the mail after your visit with us. Thank you!             Your Updated Medication List - Protect others around you: Learn how to safely use, store and throw away your medicines at www.disposemymeds.org.          This list is accurate as of 8/7/18  1:45 PM.  Always use your most recent med list.                   Brand Name Dispense Instructions for use Diagnosis    predniSONE 20 MG tablet    DELTASONE     Take 20 mg by mouth

## 2018-08-07 NOTE — MR AVS SNAPSHOT
After Visit Summary   8/7/2018    Zahida Grove    MRN: [id number]           Patient Information     Date Of Birth          1995        Visit Information        Provider Department      8/7/2018 8:00 AM UC 45 ATC; UC SPEC INFUSION [facility] Specialty [facility] Procedure        Today's Diagnoses     Acquired TTP (H)    -  1      Care Instructions    Dear Zahida Grove    Thank you for choosing [facility] ([facility]) for your infusion.  The following information is a summary of our appointment as well as important reminders.      EDUCATION POST BIOLOGICAL/CHEMOTHERAPY INFUSION  Call the triage nurse at your clinic or seek medical attention if you have chills and/or temperature greater than or equal to 100.5, uncontrolled nausea/vomiting, diarrhea, constipation, dizziness, shortness of breath, chest pain, heart palpitations, weakness or any other new or concerning symptoms, questions or concerns.  You can not have any live virus vaccines prior to or during treatment or up to 6 months post infusion.  If you have an upcoming surgery, medical procedure or dental procedure during treatment, this should be discussed with your ordering physician and your surgeon/dentist.  If you are having any concerning symptom, if you are unsure if you should get your next infusion or wish to speak to a provider before your next infusion, please call your care coordinator or triage nurse at your clinic to notify them so we can adequately serve you.        Additional information: you received an infusion of Rituxan 800 mg via IV today, along with Tylenol 650 mg, Benadryl 50 mg, and Solumedrol 100 mg via IV as pre-medications.       We look forward in seeing you on your next appointment here at [facility].  Please don t hesitate to call us at [phone] to reschedule any of your appointments or to speak with one of the [facility] registered nurses.  It was  a pleasure taking care of you today.    Sincerely,    [facility]  Specialty Infusion [facility] Procedure Center  [address] [place]  [address]  Phone:  [phone]            Follow-ups after your visit        Your next 10 appointments already scheduled     Aug 09, 2018  8:15 AM CDT   (Arrive by 8:00 AM)   New Patient Visit with JOHN Sharpe [facility] Colon and Rectal Surgery ([facility])    [address]  4th Floor  [facility] [address] [phone]            Aug 09, 2018 11:30 AM CDT   IR CVC TUNNEL REMOVAL LEFT with UUIR6   [facility] [place], Interventional Radiology ([facility] [place])    [address] [address] [phone]           1. Your doctor will need to do a history and physical within 7 days before this procedure. 2. Your doctor will which medications should not be taken the morning of the exam. 3. Laboratory tests are to be obtained by your doctor prior to the exam (Basic Metabolic Panel, CBCP, PTT and INR) (No labs needed if you are having a tunneled catheter exchange or removal) 4. If you have allergies to x-ray contrast or iodine, contact your doctor or a Radiology nurse prior to the exam day for instructions. 5. Someone will need to drive you to and from the hospital. 6. If you are or may be pregnant, contact your doctor or a Radiology nurse prior to the day of the exam. 7. If you have diabetes, check with your doctor or a Radiology nurse to see if your insulin needs to be adjusted for the exam. 8. If you are taking a medication called Glucophage or Glucovance; these medications need to be held the day of the exam and for approximately 48 hours following. A blood sample must be drawn so your creatinine level can be checked before resuming this medication. 9. If you are taking Coumadin (to thin you blood) please contact your  doctor or a Radiology nurse at least 3 days before the exam for special instructions. 10. You should not have received contrast within 48 hours of this exam. 11. The day before your exam you may eat your regular diet and are encouraged to drink at least 2 quarts of clear liquids. Drink no alcoholic beverages for 24 hours prior to the exam. 12. If you have a colostomy you will need to irrigate it with tap water at 8PM the evening before and again at 6AM the morning of the exam. 13. Do not smoke for 24 hours prior to the procedure. 14. Birth to 4 years: - Breast feeding must be stopped 4 hours prior to exam - Solid food or formula must be stopped 6 hours prior to exam - Tube feedings must be stopped 6 hours prior to exam 15. 4-10 years old: - Nothing to eat or drink 6 hours prior to exam 16. 10+ years old: - Nothing to eat or drink 8 hours prior to exam 17. The morning of the exam you may brush your teeth and take medications as directed with a sip of water. 18. When discharged, you cannot drive until morning, and an adult must be with you until then. You should stay in the [facility] overnight. 19. Bring a list of all drugs you are taking; include supplements and over-the-counter medications. Wear comfortable clothes and leave your valuables at home.            Aug 15, 2018 11:00 AM CDT   Infusion 360 with ALANA SPEC INFUSION, UC 44 ATC   [facility] Specialty [facility] Procedure ([facility])    [address]  [address]  [facility] [address]   [phone]            Oct 23, 2018 12:30 PM CDT   RETURN CLOTTING DISORDER with Damián Modi MD   [facility] ([facility])    [facility]  [address]  [facility] [address]   [phone]              Future tests that were ordered for you today     Open Future Orders        Priority Expected Expires Ordered    IR CVC Tunnel Removal  Left ASAP 8/7/2018 8/7/2019 8/7/2018            Who to contact     If you have questions or need follow up information about today's clinic visit or your schedule please contact [facility] SPECIALTY AND PROCEDURE directly at [phone].  Normal or non-critical lab and imaging results will be communicated to you by MyChart, letter or phone within 4 business days after the clinic has received the results. If you do not hear from us within 7 days, please contact the clinic through [employer] or phone. If you have a critical or abnormal lab result, we will notify you by phone as soon as possible.  Submit refill requests through [employer] or call your pharmacy and they will forward the refill request to us. Please allow 3 business days for your refill to be completed.          Additional Information About Your Visit        [employer] Information     [employer] gives you secure access to your electronic health record. If you see a primary care provider, you can also send messages to your care team and make appointments. If you have questions, please call your primary care clinic.  If you do not have a primary care provider, please call [phone] and they will assist you.        Care EveryWhere ID     This is your Care EveryWhere ID. This could be used by other organizations to access your [place] medical records  [id number]        Your Vitals Were     Pulse Temperature Respirations Pulse Oximetry BMI (Body Mass Index)       102 98.4  F (36.9  C) (Oral) 16 97% 28.42 kg/m2        Blood Pressure from Last 3 Encounters:   08/07/18 130/80   08/07/18 119/67   07/31/18 129/79    Weight from Last 3 Encounters:   08/07/18 86.7 kg (191 lb 1.6 oz)   08/07/18 86 kg (189 lb 11.2 oz)   07/31/18 85 kg (187 lb 8 oz)              We Performed the Following     *CBC with platelets differential        Primary Care Provider Fax #    Physician No Ref-Primary [phone]       No address on file        Equal Access to  Services     [facility]: Hadii garrett Ochoa, waherbieda luqadaha, qaybta kacirayulias ortiz, mickey arnold. So [facility] [phone].    ATENCIÓN: Si habla español, tiene a pendleton disposición servicios gratuitos de asistencia lingüística. Llame al [phone].    We comply with applicable federal civil rights laws and [place] laws. We do not discriminate on the basis of race, color, national origin, age, disability, sex, sexual orientation, or gender identity.            Thank you!     Thank you for choosing [facility] SPECIALTY AND PROCEDURE  for your care. Our goal is always to provide you with excellent care. Hearing back from our patients is one way we can continue to improve our services. Please take a few minutes to complete the written survey that you may receive in the mail after your visit with us. Thank you!             Your Updated Medication List - Protect others around you: Learn how to safely use, store and throw away your medicines at www.disposemymeds.org.          This list is accurate as of 8/7/18  2:56 PM.  Always use your most recent med list.                   Brand Name Dispense Instructions for use Diagnosis    predniSONE 20 MG tablet    DELTASONE     Take 20 mg by mouth

## 2018-08-07 NOTE — PATIENT INSTRUCTIONS
1. Continue Prednisone 10mg until Rod Aug 12.  2. Sometimes people feel ill after stopping Prednisone so please call us if this happens and it is okay to go back on 5mg of Prednisone each day  3. We will arrange follow up in about 2-3 months.  4. Think about whether you want another [id number] test at your next visit and consider coming a week ahead of time for that test.

## 2018-08-07 NOTE — PROGRESS NOTES
~~~ NOTE: If the patient answers yes to any of the questions below, hold the infusion and contact ordering provider or on-call provider.    1. Have you recently had an elevated temperature, fever, chills, productive cough, coughing for 3 weeks or longer or hemoptysis,  abnormal vital signs, night sweats,  chest pain or have you noticed a decrease in your appetite, unexplained weight loss or fatigue? No  2. Do you have any open wounds or new incisions? Yes; pilondial cyst  3. Do you have any recent or upcoming hospitalizations, surgeries or dental procedures? No  4. Do you currently have or recently have had any signs of illness or infection or are you on any antibiotics? No  5. Have you had any new, sudden or worsening abdominal pain? No  6. Have you or anyone in your household received a live vaccination in the past 4 weeks? Please note:  No live vaccines while on biologic/chemotherapy until 6 months after the last treatment.  Patient can receive the flu vaccine (shot only) and the pneumovax.  It is optimal for the patient to get these vaccines mid cycle, but they can be given at any time as long as it is not on the day of the infusion. No  7. Have you recently been diagnosed with any new nervous system diseases (ie. Multiple sclerosis, Guillain [place], seizures, neurological changes) or cancer diagnosis? Are you on any form of radiation or chemotherapy? No  8. Are you pregnant or breast feeding or do you have plans of pregnancy in the future? No  9. Have you been having any signs of worsening depression or suicidal ideations?  (benlysta only) No  10. Have there been any other new onset medical symptoms? No     Nursing Note  Zahida Maine [facility] for:   Chief Complaint   Patient presents with     Infusion     Rituxan     During today's [facility] appointment, orders from Dr. Modi were completed.  Frequency: weekly x 4 weeks;  today is  dose 3 of 4 total.    Progress note:  Patient identification verified by name and date of birth.  Assessment completed.  Vitals recorded in Doc Flowsheets.  Patient was provided with education regarding infusion and possible side effects.  Patient verbalized understanding.      needed: No  Premedications: administered per order.  Infusion Rates: infusion starts at 100 ml/hr, then increased by 100 ml/hr every 30 minutes to final rate of 400 ml/hr.  Approximate Infusion length:3 hours.   Labs: were drawn per orders. Additional labs for outside clinic ordered and drawn following Rituxan infusion; requisition form sent along with samples for mailing to outside lab.   Vascular access: peripheral IV placed today per pt request, even though written orders available to use central line. Dressing change performed today and each lumen Heparin locked prior to discharge. Additional 10 minutes nursing time used.   Treatment Conditions: Biologic checklist performed prior to infusion; patient denies fever, chills, signs of infection, recent illness, on antibiotics, productive cough or elevated temperature.  Patient tolerated infusion: well.    Drug Waste Record    Drug Name: Solumedrol  Dose: 100 mg  Route administered: IV  NDC #: 7191-3847-65  Amount of waste(mL):0.4 ml  Reason for waste: Single use vial      Discharge Plan:   Follow up plan of care with: ongoing infusions at [facility].  Discharge instructions were reviewed with patient.  Patient/representative verbalized understanding of discharge instructions and all questions answered.  Patient discharged from [facility] in stable condition.    Paola Maciel RN    Administrations This Visit     acetaminophen (TYLENOL) tablet 650 mg     Admin Date Action Dose Route Administered By             08/07/2018 Given 650 mg Oral Paola Maciel RN                    diphenhydrAMINE (BENADRYL) capsule 50 mg      Admin Date Action Dose Route Administered By             08/07/2018 Given 50 mg Oral Paola Maciel, RN                    methylPREDNISolone sodium succinate (solu-MEDROL) injection 100 mg     Admin Date Action Dose Route Administered By             08/07/2018 Given 100 mg Intravenous Paola Maciel, RN                    riTUXimab (RITUXAN) 800 mg in sodium chloride 0.9 % 800 mL non-oncology use     Admin Date Action Dose Route Administered By             08/07/2018 New Bag 800 mg Intravenous Paola Maciel RN                          /77  Pulse 81  Temp 98.4  F (36.9  C) (Oral)  Resp 16  Wt 86 kg (189 lb 11.2 oz)  SpO2 97%  BMI 28.42 kg/m2

## 2018-08-08 ENCOUNTER — TELEPHONE (OUTPATIENT)
Dept: SURGERY | Facility: CLINIC | Age: 23
End: 2018-08-08

## 2018-08-08 NOTE — TELEPHONE ENCOUNTER
Patient answered phone call and details of future appointment were discussed. The appointment was confirmed and the patient agreed to details.   Georgiana Hernandez, EMT

## 2018-08-09 ENCOUNTER — TELEPHONE (OUTPATIENT)
Dept: HEMATOLOGY | Facility: CLINIC | Age: 23
End: 2018-08-09

## 2018-08-09 ENCOUNTER — APPOINTMENT (OUTPATIENT)
Dept: INTERVENTIONAL RADIOLOGY/VASCULAR | Facility: CLINIC | Age: 23
End: 2018-08-09
Attending: INTERNAL MEDICINE
Payer: COMMERCIAL

## 2018-08-09 ENCOUNTER — OFFICE VISIT (OUTPATIENT)
Dept: SURGERY | Facility: CLINIC | Age: 23
End: 2018-08-09
Payer: COMMERCIAL

## 2018-08-09 ENCOUNTER — HOSPITAL ENCOUNTER (OUTPATIENT)
Facility: CLINIC | Age: 23
Discharge: HOME | End: 2018-08-09
Attending: INTERNAL MEDICINE | Admitting: INTERNAL MEDICINE
Payer: COMMERCIAL

## 2018-08-09 VITALS — SYSTOLIC BLOOD PRESSURE: 122 MMHG | HEART RATE: 79 BPM | DIASTOLIC BLOOD PRESSURE: 89 MMHG | OXYGEN SATURATION: 97 %

## 2018-08-09 VITALS
HEIGHT: 69 IN | OXYGEN SATURATION: 97 % | SYSTOLIC BLOOD PRESSURE: 108 MMHG | TEMPERATURE: 97.8 F | BODY MASS INDEX: 28.17 KG/M2 | WEIGHT: 190.2 LBS | DIASTOLIC BLOOD PRESSURE: 83 MMHG | HEART RATE: 64 BPM

## 2018-08-09 DIAGNOSIS — M31.19 ACQUIRED TTP (H): ICD-10-CM

## 2018-08-09 DIAGNOSIS — L98.8 PILONIDAL DISEASE: Primary | ICD-10-CM

## 2018-08-09 PROBLEM — M31.10 THROMBOTIC MICROANGIOPATHY (H): Status: ACTIVE | Noted: 2018-08-09

## 2018-08-09 LAB — B-HCG FREE SERPL-ACNC: 0.9 [IU]/L (ref 0.86–1.14)

## 2018-08-09 PROCEDURE — 27210995 ZZH RX 272: Performed by: STUDENT IN AN ORGANIZED HEALTH CARE EDUCATION/TRAINING PROGRAM

## 2018-08-09 PROCEDURE — 85610 PROTHROMBIN TIME: CPT

## 2018-08-09 PROCEDURE — 36589 REMOVAL TUNNELED CV CATH: CPT | Mod: LT

## 2018-08-09 RX ADMIN — LIDOCAINE HYDROCHLORIDE 1 ML: 10 INJECTION, SOLUTION EPIDURAL; INFILTRATION; INTRACAUDAL; PERINEURAL at 12:16

## 2018-08-09 ASSESSMENT — PAIN SCALES - GENERAL: PAINLEVEL: NO PAIN (0)

## 2018-08-09 NOTE — PROCEDURES
Interventional Radiology Brief Post Procedure Note    Procedure: IR CVC TUNNEL REMOVAL LEFT    Proceduralist: Yusra Connolly MS, FUNMI    Assistant: None    Time Out: Prior to the start of the procedure and with procedural staff participation, I verbally confirmed the patient s identity using two indicators, relevant allergies, that the procedure was appropriate and matched the consent or emergent situation, and that the correct equipment/implants were available. Immediately prior to starting the procedure I conducted the Time Out with the procedural staff and re-confirmed the patient s name, procedure, and site/side. (The Joint Commission universal protocol was followed.)  Yes    Medications   Medication Event Details Admin User Admin Time   lidocaine BUFFERED 1 % injection 1-30 mL Medication Given by Other Clinician Dose: 1 mL; Route: Intradermal; Scheduled Time: 12:00 PM Mikki Silverman RN 8/9/2018 12:16 PM       Sedation: None. Local Anesthestic used    Findings: Completed removal of right tunneled CVC in its entirety.     Estimated Blood Loss: Minimal    Fluoroscopy Time: 0 minute(s)    SPECIMENS: None    Complications: 1. None     Condition: Stable    Plan: Follow up per primary team.     Comments: See dictated procedure note for full details.    Yusra Connolly PA-C

## 2018-08-09 NOTE — PROGRESS NOTES
Interventional Radiology Intra-procedural Nursing Note    Patient Name: Zahida Grove  Medical Record Number: [id number]  Today's Date: August 9, 2018    Procedure: tunneled central line removal    Attending MD in room during timeout:  Proceduralist: Yusra Michele PA-C    Procedure Start Time: 1200  Procedure Puncture time: 1201  Procedure End Time: 1210    Sedation start time:  Sedation end time:  Sedation medications given: none given    Report given to: needed  : not needed    D: Patient brought to IR room 6 at 1130. Verified Patient's ID consent using two identifiers. Informed consent obtained by Dr Goodson. Patient denied having questions or concerns. I-STAT INR is 0.9.  A: Patient was positioned supine with head of bed up 45 degrees and was monitored per IR protocol. Patient tolerated the procedure without apparent incident. The dressing over the right internal jugular tunneled central venous catheter removal site is clean, dry and intact. Post catheter removal care instructions were reviewed with the Patient. Her questions were answered. She was sent home with a copy of the instructions.   P: Patient was returned to the gold waiting room. She was discharged to home and self care accompanied by her Mom.      Mikki Silverman RN  [phone]

## 2018-08-09 NOTE — TELEPHONE ENCOUNTER
I tried to reach Ms. Grove, a 22 year old woman with acquired TTP,  but was unable and VM was full.A So I called Mrs. Grove to let  the family know that her ADAMTS 13 activity from 8/7/2018 sent to the [facility] was 112%. (>67% considered normal). The plan is continue with current treatment and monitoring plan.

## 2018-08-09 NOTE — NURSING NOTE
"Chief Complaint   Patient presents with     Consult     Pilonnidal cyst.       Vitals:    08/09/18 0825   BP: 108/83   BP Location: Left arm   Patient Position: Sitting   Cuff Size: Adult Regular   Pulse: 64   Temp: 97.8  F (36.6  C)   TempSrc: Oral   SpO2: 97%   Weight: 190 lb 3.2 oz   Height: 5' 9\"       Body mass index is 28.09 kg/(m^2).      Georgiana Hernandez, EMT                      "

## 2018-08-09 NOTE — LETTER
2018      RE: Zahida Grove  [address]  [facility] [address]       Colon and Rectal Surgery Consult Clinic Note    Date: 2018     Referring provider:  Damián Modi MD  [address] [place] [address]     RE: Zahida Grove  : 1995  JD: 2018    Zahida Grove is a very pleasant 22 year old female with thrombotic microangiopathy (TTP) with a recent diagnosis of pilonidal cyst.  Given these findings they were subsequently sent to the Colon and Rectal Surgery Clinic for an opinion on this and a new patient consultation.     Zahida developed a cyst while she was studying abroad in [place] several months ago.  This is never been painful but she has had occasional drainage.  Well at [facility] being treated for TTP she was seen by internal medicine and was started on an antibiotic.  She currently has no drainage or pain.  She has never had to have an abscess drained at this site.  She is just finishing up a prednisone taper for her TTP and is currently on 10 mg.  She is not a smoker.  She is going to be starting her last year of college at [employer] for graphic design.    Assessment/Plan: 22 year old female with pilonidal disease.  On exam she has 3 fairly large pilonidal pits in the  cleft.  Above this, she does have a scabbed over area without underlying palpable abscess or cyst.  I removed a moderate amount of hair from the pits and shave the surrounding here today.  She is currently asymptomatic and has had minimal symptoms with this, surgical intervention is not necessary.  I also think it would be advisable to wait until she is off of the steroids for some time before surgery. If she has persistent symptoms or worsening disease, could consider surgical management.  She would like to continue with hair removal and see if she can keep symptoms manageable until she has a break from school.  If she has recurrent symptoms, would like her to return to see 1 of the  "surgeons to discuss surgical management given her TTP.  If she develops an abscess in the meantime, she can return to clinic any time for I&D if needed.  Recommended weekly to biweekly shaving near the  cleft and discussed considering laser hair removal to try to prevent recurrence.  Zahida and her mother's questions were answered to their stated satisfaction and they are in agreement with this plan.    Medical history:  No past medical history on file.    Surgical history:  No past surgical history on file.    Problem list:  Patient Active Problem List    Diagnosis Date Noted     Acquired TTP (H) 2018     Priority: Medium       Medications:  Current Outpatient Prescriptions   Medication Sig Dispense Refill     predniSONE (DELTASONE) 20 MG tablet Take 20 mg by mouth         Allergies:  No Known Allergies    Family history:  No family history on file.    Social history:  Social History   Substance Use Topics     Smoking status: Never Smoker     Smokeless tobacco: Never Used     Alcohol use Not on file    Marital status: single.  Occupation: student.    Nursing Notes:   Georgiana Hernandez CMA  2018  8:29 AM  Signed  Chief Complaint   Patient presents with     Consult     Pilonnidal cyst.       Vitals:    18 0825   BP: 108/83   BP Location: Left arm   Patient Position: Sitting   Cuff Size: Adult Regular   Pulse: 64   Temp: 97.8  F (36.6  C)   TempSrc: Oral   SpO2: 97%   Weight: 190 lb 3.2 oz   Height: 5' 9\"       Body mass index is 28.09 kg/(m^2).      ANJALI Tineo                         Physical Examination:  /83 (BP Location: Left arm, Patient Position: Sitting, Cuff Size: Adult Regular)  Pulse 64  Temp 97.8  F (36.6  C) (Oral)  Ht 5' 9\"  Wt 190 lb 3.2 oz  SpO2 97%  BMI 28.09 kg/m2  General: alert, oriented, in no acute distress, sitting comfortably  HEENT: mucous membranes mosit  Perianal external examination: Exam was chaperoned by ANJALI Tineo   Perianal skin: " Intact with no excoriation or lichenification.  Lesions: No evidence of an external lesion, nodularity, or induration in the perianal region. Three fairly deep pits present in the pippa cleft. Hair was removed from these. Above these in the midline there is a scabbed area without induration, erythema, or drainage.  Eversion of buttocks: There was not evidence of an anal fissure. Details: N/A.  Skin tags or external hemorrhoids: None.  Digital rectal examination: Was deferred    Anoscopy: Was deferred    Total face to face time was 30 minutes, >50% counseling.    JOHN Forrest, NP-C  Colon and Rectal Surgery   [facility]    This note was created using speech recognition software and may contain unintended word substitutions.      JOHN Forrest CNP

## 2018-08-10 LAB — LAB SCANNED RESULT: NORMAL

## 2018-08-15 PROBLEM — M31.10 THROMBOTIC MICROANGIOPATHY (H): Status: RESOLVED | Noted: 2018-08-09 | Resolved: 2018-08-15

## 2018-08-15 NOTE — PROGRESS NOTES
[facility]  [address] [place] [address]  Phone: [phone], Fax: [phone]      Outpatient Visit Note:    Patient: Zahida Grove  MRN: [id number]  : 1995  JD: Aug 7, 2018      Reason for Visit:  Follow up for acquired TTP    Forward History:  2018 with new anemia and thrombocytopenia to local hospital, initially thought to be ITP then TTP recognized and started on TPE.  [id number] deficiency confirmed and + inhibitor.  Treated with Pred 1 mg/kg/day  2018 found to have recurrent thrombocytopenia as an outpatient and readmitted for treatment of an exacerbation. Had not tapered steroids yet  Seen in consultation on 2018 at this clinic, planned to continue Pred 1 mg/kg until  and then 4 week taper of Prednisone to end Aug 13  Rituximab 375mg/m2 given weekly x 4, last dose 18    Interval History: Zahida Grove is a 22 year old woman with a history of acquired TTP who returns for routine follow up.  She reports that she is feeling well with no complaints.  Platelets have held and we have monitored 1-2 x per week since I saw her in July.  She tolerated the Prednisone taper well and no infusion reactions to rituximab.  Overall she has no complaints other than she desires to have the apheresis catheter removed.      Past Medical History:  Past Medical History:   Diagnosis Date     TTP (thrombotic thrombocytopenic purpura) (H)        Medications:  Current Outpatient Prescriptions   Medication Sig Dispense Refill     predniSONE (DELTASONE) 20 MG tablet Take 20 mg by mouth          Allergies:  No Known Allergies    ROS:  A 14 point ROS is negative except as stated in the HPI    Objective:  Vitals: B/P: 130/80, T: 97.6, P: 77, R: Data Unavailable, Wt: 191 lbs 1.6 oz  Exam:   Gen: Appears well, no distress  Skin: no tenderness at the catheter site, no petechiae or ecchymoses  Neuro: no deficits appreciated, normal  affect    Labs:  [id number] drawn today, returned at 112%    Platelet counts have been normal    Imaging:  none    Assessment:  In summary, Zahida Grove is a 22 year old woman with acquired TTP, now in both clinical and biochemical remission following rituximab therapy initiated after she had an exacerbation.  Overall no overt neurologic symptoms and she is doing very well.    Plan:  1. Okay to remove CVC   2. Will communicate [id number] results when they return  3. Stop Pred on Aug 12 and if new symptoms of adrenal insufficient (discussed these today) will call and plan to go back up to 5 mg of Pred  4. Follow up in 2-3 months, consider [id number] monitoring if she desires.    The patient is given our center's contact information and is instructed to call if she should have any further questions or concerns.  Otherwise, we will plan on seeing her back in the Fall of 2018.      Total Time Spent:  I spent a total of 25 minutes face-to-face with Zahida Grove during today's office visit.  Over 50% of this time was spent counseling the patient and/or coordinating care regarding acquired TTP.      Damián Modi MD   of Medicine  [facility] [employer] [facility] [facility] [employer]

## 2018-08-16 ENCOUNTER — INFUSION THERAPY VISIT (OUTPATIENT)
Dept: INFUSION THERAPY | Facility: CLINIC | Age: 23
End: 2018-08-16
Attending: NURSE PRACTITIONER
Payer: COMMERCIAL

## 2018-08-16 VITALS
SYSTOLIC BLOOD PRESSURE: 124 MMHG | BODY MASS INDEX: 28.21 KG/M2 | HEART RATE: 80 BPM | DIASTOLIC BLOOD PRESSURE: 66 MMHG | WEIGHT: 191 LBS | TEMPERATURE: 97.7 F | RESPIRATION RATE: 14 BRPM

## 2018-08-16 DIAGNOSIS — M31.19 ACQUIRED TTP (H): Primary | ICD-10-CM

## 2018-08-16 PROCEDURE — 96375 TX/PRO/DX INJ NEW DRUG ADDON: CPT

## 2018-08-16 PROCEDURE — 96413 CHEMO IV INFUSION 1 HR: CPT

## 2018-08-16 PROCEDURE — 96415 CHEMO IV INFUSION ADDL HR: CPT

## 2018-08-16 PROCEDURE — 25000128 H RX IP 250 OP 636: Performed by: INTERNAL MEDICINE

## 2018-08-16 PROCEDURE — 25000132 ZZH RX MED GY IP 250 OP 250 PS 637: Performed by: INTERNAL MEDICINE

## 2018-08-16 RX ORDER — DIPHENHYDRAMINE HCL 25 MG
50 CAPSULE ORAL ONCE
Status: CANCELLED
Start: 2018-08-16

## 2018-08-16 RX ORDER — DIPHENHYDRAMINE HCL 25 MG
50 CAPSULE ORAL ONCE
Status: COMPLETED | OUTPATIENT
Start: 2018-08-16 | End: 2018-08-16

## 2018-08-16 RX ORDER — METHYLPREDNISOLONE SODIUM SUCCINATE 125 MG/2ML
100 INJECTION, POWDER, LYOPHILIZED, FOR SOLUTION INTRAMUSCULAR; INTRAVENOUS ONCE
Status: COMPLETED | OUTPATIENT
Start: 2018-08-16 | End: 2018-08-16

## 2018-08-16 RX ORDER — HEPARIN SODIUM (PORCINE) LOCK FLUSH IV SOLN 100 UNIT/ML 100 UNIT/ML
5 SOLUTION INTRAVENOUS ONCE
Status: DISCONTINUED | OUTPATIENT
Start: 2018-08-16 | End: 2018-08-16 | Stop reason: HOSPADM

## 2018-08-16 RX ORDER — METHYLPREDNISOLONE SODIUM SUCCINATE 125 MG/2ML
100 INJECTION, POWDER, LYOPHILIZED, FOR SOLUTION INTRAMUSCULAR; INTRAVENOUS ONCE
Status: CANCELLED | OUTPATIENT
Start: 2018-08-16

## 2018-08-16 RX ORDER — ACETAMINOPHEN 325 MG/1
650 TABLET ORAL ONCE
Status: CANCELLED
Start: 2018-08-16

## 2018-08-16 RX ORDER — HEPARIN SODIUM (PORCINE) LOCK FLUSH IV SOLN 100 UNIT/ML 100 UNIT/ML
5 SOLUTION INTRAVENOUS ONCE
Status: CANCELLED
Start: 2018-08-16 | End: 2018-08-16

## 2018-08-16 RX ORDER — ACETAMINOPHEN 325 MG/1
650 TABLET ORAL ONCE
Status: COMPLETED | OUTPATIENT
Start: 2018-08-16 | End: 2018-08-16

## 2018-08-16 RX ADMIN — ACETAMINOPHEN 650 MG: 325 TABLET ORAL at 09:24

## 2018-08-16 RX ADMIN — RITUXIMAB 800 MG: 10 INJECTION, SOLUTION INTRAVENOUS at 09:57

## 2018-08-16 RX ADMIN — METHYLPREDNISOLONE SODIUM SUCCINATE 100 MG: 125 INJECTION, POWDER, FOR SOLUTION INTRAMUSCULAR; INTRAVENOUS at 09:42

## 2018-08-16 RX ADMIN — DIPHENHYDRAMINE HYDROCHLORIDE 50 MG: 25 CAPSULE ORAL at 09:23

## 2018-08-16 RX ADMIN — SODIUM CHLORIDE 250 ML: 9 INJECTION, SOLUTION INTRAVENOUS at 09:34

## 2018-08-16 ASSESSMENT — PAIN SCALES - GENERAL: PAINLEVEL: NO PAIN (0)

## 2018-08-16 NOTE — MR AVS SNAPSHOT
After Visit Summary   8/16/2018    Zahida Grove    MRN: [id number]           Patient Information     Date Of Birth          1995        Visit Information        Provider Department      8/16/2018 9:00 AM  INFUSION CHAIR [facility]        Today's Diagnoses     Acquired TTP (H)    -  1       Follow-ups after your visit        Follow-up notes from your care team     Return if symptoms worsen or fail to improve.      Your next 10 appointments already scheduled     Oct 23, 2018 12:30 PM CDT   RETURN CLOTTING DISORDER with Damián Modi MD   [facility] ([facility])    [address] [facility]  [address] [place] [address] [phone]              Who to contact     If you have questions or need follow up information about today's clinic visit or your schedule please contact [facility] directly at [phone].  Normal or non-critical lab and imaging results will be communicated to you by My-Hammerhart, letter or phone within 4 business days after the clinic has received the results. If you do not hear from us within 7 days, please contact the clinic through My-Hammerhart or phone. If you have a critical or abnormal lab result, we will notify you by phone as soon as possible.  Submit refill requests through Rockabox or call your pharmacy and they will forward the refill request to us. Please allow 3 business days for your refill to be completed.          Additional Information About Your Visit        MyChart Information     Rockabox gives you secure access to your electronic health record. If you see a primary care provider, you can also send messages to your care team and make appointments. If you have questions, please call your primary care clinic.  If you do not have a primary care provider, please call [phone] and they will assist you.        Care  EveryWhere ID     This is your Care EveryWhere ID. This could be used by other organizations to access your [facility] medical records  [id number]        Your Vitals Were     Pulse Temperature Respirations BMI (Body Mass Index)          80 97.7  F (36.5  C) (Oral) 14 28.21 kg/m2         Blood Pressure from Last 3 Encounters:   08/16/18 124/66   08/09/18 122/89   08/09/18 108/83    Weight from Last 3 Encounters:   08/16/18 86.6 kg (191 lb)   08/09/18 86.3 kg (190 lb 3.2 oz)   08/07/18 86.7 kg (191 lb 1.6 oz)              Today, you had the following     No orders found for display       Primary Care Provider Fax #    Physician No Ref-Primary [phone]       No address on file        Equal Access to Services     [facility] : Griffin Ochoa, randi rubin, gomez coolalstacey ortiz, mickey santana . So [facility] [phone].    ATENCIÓN: Si habla español, tiene a pendleton disposición servicios gratuitos de asistencia lingüística. Mary al [phone].    We comply with applicable federal civil rights laws and [place] laws. We do not discriminate on the basis of race, color, national origin, age, disability, sex, sexual orientation, or gender identity.            Thank you!     Thank you for choosing [facility]  for your care. Our goal is always to provide you with excellent care. Hearing back from our patients is one way we can continue to improve our services. Please take a few minutes to complete the written survey that you may receive in the mail after your visit with us. Thank you!             Your Updated Medication List - Protect others around you: Learn how to safely use, store and throw away your medicines at www.disposemymeds.org.      Notice  As of 8/16/2018 12:45 PM    You have not been prescribed any medications.

## 2018-08-16 NOTE — PROGRESS NOTES
"Infusion Nursing Note:  Zahida Grove presents today for Rituxan.    Patient seen by provider today: No   present during visit today: Not Applicable.    Note: Just getting off Prednisone .    Intravenous Access:  Peripheral IV placed.    Treatment Conditions:  Rheumatology Infusion Checklist: PRIOR TO INFUSION OF BIOLOGICAL MEDICATIONS OR ANY OF THESE AS LISTED: Rituxan (rituximab) \".rheumbiologicalchecklist\"    Prior to Infusion of biological medications or any of these as listed:    1. Elevated temperature, fever, chills, productive cough or abnormal vital signs, night sweats, coughing up blood or sputum, no appetite or abnormal vital signs : NO    2. Open wounds or new incisions: NO    3. Recent hospitalization: NO    4.  Recent surgeries:  NO    5. Any upcoming surgeries or dental procedures?:NO    6. Any current or recent bouts of illness or infection? On any antibiotics? : NO    7. Any new, sudden or worsening abdominal pain :NO    8. Vaccination within 4 weeks? Patient or someone in the household is scheduled to receive vaccination? No live virus vaccines prior to or during treatment :NO    9. Any nervous system diseases [i.e. multiple sclerosis, Guillain-Medora, seizures, neurological  changes]: NO    10. Pregnant or breast feeding; or plans on pregnancy in the future: NO    11. Signs of worsening depression or suicidal ideations while taking benlysta:NO    12. New-onset medical symptoms: NO    13.  New cancer diagnosis or on chemotherapy or radiation NO    14.  Evaluate for any sign of active TB [Unexplained weight loss, Loss of appetite, Night sweats, Fever, Fatigue, Chills, Coughing for 3 weeks or longer, Hemoptysis (coughing up blood), Chest pain]: NO    **Note: If answered yes to any of the above, hold the infusion and contact ordering rheumatologist or on-call rheumatologist.   .      Post Infusion Assessment:  Patient tolerated infusion without incident.  Blood return noted pre and post " infusion.  Site patent and intact, free from redness, edema or discomfort.  No evidence of extravasations.  Access discontinued per protocol.  Rheumatology Post Infusion: POST-INFUSION OF BIOLOGICAL MEDICATION:    Reviewed with patient.  Given biologic medication or medication hand-out. Inform patient if any fever, chills or signs of infection, new symptoms, abdominal pain, heart palpitations, shortness of breath, reaction, weakness, neurological changes, seek medical attention immediately and should not receive infusions. No live virus vaccines prior to or during treatment or up to 6 months post infusion. If the patient has an upcoming procedure or surgery, this should be discussed with the rheumatologist and surgeon or provider..    Discharge Plan:   Discharge instructions reviewed with: Patient and Family.  Patient and/or family verbalized understanding of discharge instructions and all questions answered.  Copy of AVS reviewed with patient and/or family.  Patient will return prn for next appointment.  Patient discharged in stable condition accompanied by: self and mother.  Departure Mode: Ambulatory.    Megan Kelsey RN

## 2018-09-17 NOTE — PROGRESS NOTES
[facility]  [address] [place] [address] [address], [place] [address]  Main: [phone], Fax: [phone]      2018      To: [facility]   Phone:  [phone]   Fax:  [phone]      From: Damián Modi MD   Asst. Professor Hematology   [facility]       Re: Zahida Grove    : 1995       Zahida Grove has acquired TTP (Thrombotic thrombocytopenic purpura) and requires the following lab work:    1.  CBC with platelet and differential monthly.  2.  Please fax results to Dr. Modi or Martha Hunter RN at fax # [phone]      With questions, please call our center at [phone].      Thanks so much!    Isabell Duggan, RN - Nurse Clinician - [facility] - [phone]

## 2018-09-26 ENCOUNTER — HOME INFUSION (PRE-WILLOW HOME INFUSION) (OUTPATIENT)
Dept: PHARMACY | Facility: CLINIC | Age: 23
End: 2018-09-26

## 2018-10-02 NOTE — PROGRESS NOTES
This is a recent snapshot of the patient's [facility] medical record.  For current drug dose and complete information and questions, call [phone] or In Basket pool, fv home infusion ([id number])  CSN Number:  [id number]

## 2018-10-23 ENCOUNTER — OFFICE VISIT (OUTPATIENT)
Dept: HEMATOLOGY | Facility: CLINIC | Age: 23
End: 2018-10-23
Attending: INTERNAL MEDICINE
Payer: COMMERCIAL

## 2018-10-23 VITALS
TEMPERATURE: 98.6 F | SYSTOLIC BLOOD PRESSURE: 120 MMHG | HEART RATE: 66 BPM | WEIGHT: 194 LBS | OXYGEN SATURATION: 97 % | BODY MASS INDEX: 28.73 KG/M2 | DIASTOLIC BLOOD PRESSURE: 83 MMHG | RESPIRATION RATE: 12 BRPM | HEIGHT: 69 IN

## 2018-10-23 DIAGNOSIS — M31.19 ACQUIRED TTP (H): Primary | ICD-10-CM

## 2018-10-23 PROCEDURE — 99213 OFFICE O/P EST LOW 20 MIN: CPT | Performed by: INTERNAL MEDICINE

## 2018-10-23 ASSESSMENT — PAIN SCALES - GENERAL: PAINLEVEL: NO PAIN (0)

## 2018-10-23 NOTE — MR AVS SNAPSHOT
After Visit Summary   10/23/2018    Zahida Grove    MRN: [id number]           Patient Information     Date Of Birth          1995        Visit Information        Provider Department      10/23/2018 12:30 PM Damián Modi MD [facility]        Today's Diagnoses     Acquired TTP (H)    -  1       Follow-ups after your visit        Follow-up notes from your care team     Return in about 7 months (around 5/23/2019).      Your next 10 appointments already scheduled     Jun 04, 2019  1:00 PM CDT   RETURN CLOTTING DISORDER with Damián Modi MD   [facility] ([facility])    [address] [facility]  [address] [place] [address] [phone]              Who to contact     If you have questions or need follow up information about today's clinic visit or your schedule please contact [facility] directly at [phone].  Normal or non-critical lab and imaging results will be communicated to you by Ceonhart, letter or phone within 4 business days after the clinic has received the results. If you do not hear from us within 7 days, please contact the clinic through Ocean Seedt or phone. If you have a critical or abnormal lab result, we will notify you by phone as soon as possible.  Submit refill requests through TappIn or call your pharmacy and they will forward the refill request to us. Please allow 3 business days for your refill to be completed.          Additional Information About Your Visit        MyChart Information     TappIn gives you secure access to your electronic health record. If you see a primary care provider, you can also send messages to your care team and make appointments. If you have questions, please call your primary care clinic.  If you do not have a primary care provider, please call [phone] and they will assist you.        Care  "EveryWhere ID     This is your Care EveryWhere ID. This could be used by other organizations to access your [place] medical records  [id number]        Your Vitals Were     Pulse Temperature Respirations Height Pulse Oximetry BMI (Body Mass Index)    66 98.6  F (37  C) (Oral) 12 1.753 m (5' 9\") 97% 28.65 kg/m2       Blood Pressure from Last 3 Encounters:   10/23/18 120/83   08/16/18 124/66   08/09/18 122/89    Weight from Last 3 Encounters:   10/23/18 88 kg (194 lb)   08/16/18 86.6 kg (191 lb)   08/09/18 86.3 kg (190 lb 3.2 oz)              Today, you had the following     No orders found for display       Primary Care Provider Fax #    Physician No Ref-Primary [phone]       No address on file        Equal Access to Services     KD AGUILAR : Griffin Ochoa, randi rubin, gomez ortiz, mickey santana . So [facility] [phone].    ATENCIÓN: Si habla español, tiene a pendleton disposición servicios gratuitos de asistencia lingüística. Llame al [phone].    We comply with applicable federal civil rights laws and [place] laws. We do not discriminate on the basis of race, color, national origin, age, disability, sex, sexual orientation, or gender identity.            Thank you!     Thank you for choosing [facility]  for your care. Our goal is always to provide you with excellent care. Hearing back from our patients is one way we can continue to improve our services. Please take a few minutes to complete the written survey that you may receive in the mail after your visit with us. Thank you!             Your Updated Medication List - Protect others around you: Learn how to safely use, store and throw away your medicines at www.disposemymeds.org.      Notice  As of 10/23/2018  5:18 PM    You have not been prescribed any medications.      "

## 2018-10-23 NOTE — PROGRESS NOTES
[facility]  [address] [place] [address]  Phone: [phone], Fax: [phone]      Outpatient Visit Note:    Patient: Zahida Grove  MRN: [id number]  : 1995  JD: Oct 23, 2018    Reason for Visit:  Follow up for acquired TTP    Forward History:  2018 with new anemia and thrombocytopenia to local hospital, initially thought to be ITP then TTP recognized and started on TPE.  OFJWBI92 deficiency confirmed and + inhibitor.  Treated with Pred 1 mg/kg/day  2018 found to have recurrent thrombocytopenia as an outpatient and readmitted for treatment of an exacerbation. Had not tapered steroids yet  Seen in consultation on 2018 at this clinic, planned to continue Pred 1 mg/kg until  and then 4 week taper of Prednisone to end Aug 13  Rituximab 375mg/m2 given weekly x 4, last dose 18    Interval History: Zahida Grove is a 23 year old female  with a history of acquired TTP who returns for routine follow up.  She reports that she has been doing well since the last time I saw her in 2018.  She completed 4 doses of standard dose rituximab without incident.  She has now returned to college for her senior year and returns today with her parents.  She notes no changes in her mood such as anxiety or depression depressive symptoms.  She reports that things that typically make her happy still do.  She has a good appetite and energy level.  She has no symptoms to suggest any sort of cognitive impairment such as forgetfulness or other problems with memory.  She has been doing well in school and expects to graduate this year.  She does not have any long-term plans for where she will relocate to after college.    Past Medical History:  Past Medical History:   Diagnosis Date     TTP (thrombotic thrombocytopenic purpura) (H)        Medications:  No current outpatient prescriptions on file.      Allergies:  No Known Allergies    ROS:  A 14  point ROS is negative except as stated in the HPI    Objective:  Vitals: B/P: 120/83, T: 98.6, P: 66, R: 12, Wt: 194 lbs 0 oz  Exam:   Gen: Appears well, no distress  Remainder of today's exam is deferred as the visit is primarily for counseling    Labs:  Reviewed her monthly labs since she is returning to college and she has a normal platelet count    Imaging:  None    Assessment:  In summary, Zahida Grove is a 23 year old female with acquired TTP now in durable remission, following up from rituximab that was in response to a disease observation.  She reports no neurocognitive symptoms at this point and is overall feeling well.  We spent most of today's visit discussing the risks and benefits to checking KABEWF39 activity.  I explained that the natural history of acquired TTP is such that about 30% of patients will have a durable biochemical remission with normal VGJNTJ70 long-term.  However many patients will have variable levels of the enzyme and the predictive value of this biomarker is very poor.  I explained that 1 of the risks of checking the NLHAQR24 level today is that low level may not predict a recurrence but may create anxiety and worry about a recurrence.  I would suggest that if we check the level that we would have a plan of action, meaning that if her levels were low we would recheck in about a month and a from if it remain persistently low we would consider a second round of rituximab immunosuppression.  I explained that while in [place] this is much more common place here in the [place] view taken a more conservative approach.  My own personal approach is to consider prophylactic rituximab only in patients who clearly have a relapsing phenotype.  At this point it still unknown whether she will ever have a recurrence and so she is opted for the moment to go without further testing.    Plan:  1. Majority of today's visit was spent counseling the patient regarding acquired TTP as above.   2. Counseled  about signs and symptoms of relapse  3. CBC monthly and PRN for symptoms  4. Discussed our upcoming TTP support group  5. RTC June 2019 when she has graduated from college to discuss further testing.    The patient is given our center's contact information and is instructed to call if she should have any further questions or concerns.  Otherwise, we will plan on seeing her back next summer.      Total Time Spent:  I spent a total of 25 minutes face-to-face with Zahida Grove during today's office visit.  Over 50% of this time was spent counseling the patient and/or coordinating care regarding acquired TTP.      Damián Modi MD   of Medicine  [facility] [employer]

## 2018-12-31 ENCOUNTER — TELEPHONE (OUTPATIENT)
Dept: HEMATOLOGY | Facility: CLINIC | Age: 23
End: 2018-12-31

## 2019-01-09 NOTE — TELEPHONE ENCOUNTER
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 Damián Modi.     She is calling to report some blood in the stool over the past 2-3 weeks.  This occurs only when constipated, usually right before her period.  She might drip a small amount of blood right after a BM, and has blood in tissue, but no other bleeding.  This does not occur with every bowel movement.     She denies any bruising, signs of bleeding elesewhere in her body and has no other somatic complaints.  She does take Miralax occasionally to loosen her stools.     I told her it sounds like she might have a small hemorrhoid and  recommended that she take Miralax for the next few days and stay well hydrated so that the area can heal.  If she is having more or concerning bleeding she should go to be evaluated.  She does have a standing order at her school health service for CBC which she could have done.  She did not feel like this was warranted at this time.    I did review with Dr. Modi who agrees with the above.I encouraged her to call with further concerns.    Isabell Duggan, RN - Nurse Clinician - [facility] - [phone]

## 2019-05-06 ENCOUNTER — TELEPHONE (OUTPATIENT)
Dept: HEMATOLOGY | Facility: CLINIC | Age: 24
End: 2019-05-06

## 2019-06-25 ENCOUNTER — TELEPHONE (OUTPATIENT)
Dept: HEMATOLOGY | Facility: CLINIC | Age: 24
End: 2019-06-25

## 2019-07-09 ENCOUNTER — OFFICE VISIT (OUTPATIENT)
Dept: HEMATOLOGY | Facility: CLINIC | Age: 24
End: 2019-07-09
Attending: INTERNAL MEDICINE
Payer: COMMERCIAL

## 2019-07-09 VITALS
RESPIRATION RATE: 14 BRPM | HEART RATE: 65 BPM | DIASTOLIC BLOOD PRESSURE: 77 MMHG | TEMPERATURE: 98.4 F | OXYGEN SATURATION: 99 % | HEIGHT: 69 IN | BODY MASS INDEX: 28.39 KG/M2 | SYSTOLIC BLOOD PRESSURE: 130 MMHG | WEIGHT: 191.7 LBS

## 2019-07-09 DIAGNOSIS — Z86.2 HISTORY OF TTP (THROMBOTIC THROMBOCYTOPENIC PURPURA): Primary | ICD-10-CM

## 2019-07-09 LAB
BASOPHILS # BLD AUTO: 0.1 10E9/L (ref 0–0.2)
BASOPHILS NFR BLD AUTO: 0.9 %
DIFFERENTIAL METHOD BLD: ABNORMAL
EOSINOPHIL # BLD AUTO: 0.2 10E9/L (ref 0–0.7)
EOSINOPHIL NFR BLD AUTO: 2.8 %
ERYTHROCYTE [DISTWIDTH] IN BLOOD BY AUTOMATED COUNT: 13.9 % (ref 10–15)
HCT VFR BLD AUTO: 40.3 % (ref 35–47)
HGB BLD-MCNC: 13.4 G/DL (ref 11.7–15.7)
IMM GRANULOCYTES # BLD: 0 10E9/L (ref 0–0.4)
IMM GRANULOCYTES NFR BLD: 0.2 %
LYMPHOCYTES # BLD AUTO: 1 10E9/L (ref 0.8–5.3)
LYMPHOCYTES NFR BLD AUTO: 17.9 %
MCH RBC QN AUTO: 26.3 PG (ref 26.5–33)
MCHC RBC AUTO-ENTMCNC: 33.3 G/DL (ref 31.5–36.5)
MCV RBC AUTO: 79 FL (ref 78–100)
MONOCYTES # BLD AUTO: 0.5 10E9/L (ref 0–1.3)
MONOCYTES NFR BLD AUTO: 9 %
NEUTROPHILS # BLD AUTO: 4 10E9/L (ref 1.6–8.3)
NEUTROPHILS NFR BLD AUTO: 69.2 %
NRBC # BLD AUTO: 0 10*3/UL
NRBC BLD AUTO-RTO: 0 /100
PLATELET # BLD AUTO: 336 10E9/L (ref 150–450)
RBC # BLD AUTO: 5.09 10E12/L (ref 3.8–5.2)
WBC # BLD AUTO: 5.8 10E9/L (ref 4–11)

## 2019-07-09 PROCEDURE — 85025 COMPLETE CBC W/AUTO DIFF WBC: CPT | Performed by: INTERNAL MEDICINE

## 2019-07-09 PROCEDURE — G0463 HOSPITAL OUTPT CLINIC VISIT: HCPCS

## 2019-07-09 PROCEDURE — 85397 CLOTTING FUNCT ACTIVITY: CPT | Performed by: INTERNAL MEDICINE

## 2019-07-09 PROCEDURE — 99214 OFFICE O/P EST MOD 30 MIN: CPT | Performed by: INTERNAL MEDICINE

## 2019-07-09 ASSESSMENT — MIFFLIN-ST. JEOR: SCORE: 1693.93

## 2019-07-09 ASSESSMENT — PAIN SCALES - GENERAL: PAINLEVEL: NO PAIN (0)

## 2019-07-09 NOTE — PATIENT INSTRUCTIONS
1. We will call you with the laboratory results from today.   2. The research nurse will reach out to you regarding the TTP study.   3. Return to clinic in 6 months.

## 2019-07-10 ENCOUNTER — TELEPHONE (OUTPATIENT)
Dept: HEMATOLOGY | Facility: CLINIC | Age: 24
End: 2019-07-10

## 2019-07-10 NOTE — TELEPHONE ENCOUNTER
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 I called her to let her know that her platelet count from yesterday was 336K and that the rest of the CBC looked OK. We will call her back when the ADAMTS 13 returns.

## 2019-07-11 LAB — Lab: 144 %

## 2019-07-12 ENCOUNTER — TELEPHONE (OUTPATIENT)
Dept: HEMATOLOGY | Facility: CLINIC | Age: 24
End: 2019-07-12

## 2019-07-12 LAB — LAB SCANNED RESULT: NORMAL

## 2019-07-15 ENCOUNTER — DOCUMENTATION ONLY (OUTPATIENT)
Dept: HEMATOLOGY | Facility: CLINIC | Age: 24
End: 2019-07-15

## 2020-01-30 ENCOUNTER — OFFICE VISIT (OUTPATIENT)
Dept: HEMATOLOGY | Facility: CLINIC | Age: 25
End: 2020-01-30
Attending: INTERNAL MEDICINE
Payer: COMMERCIAL

## 2020-01-30 VITALS
TEMPERATURE: 98.1 F | OXYGEN SATURATION: 99 % | HEART RATE: 76 BPM | HEIGHT: 69 IN | DIASTOLIC BLOOD PRESSURE: 79 MMHG | RESPIRATION RATE: 12 BRPM | WEIGHT: 197 LBS | SYSTOLIC BLOOD PRESSURE: 120 MMHG | BODY MASS INDEX: 29.18 KG/M2

## 2020-01-30 DIAGNOSIS — M31.19 T.T.P. SYNDROME (H): Primary | ICD-10-CM

## 2020-01-30 PROCEDURE — 99213 OFFICE O/P EST LOW 20 MIN: CPT | Performed by: INTERNAL MEDICINE

## 2020-01-30 PROCEDURE — 85397 CLOTTING FUNCT ACTIVITY: CPT | Performed by: INTERNAL MEDICINE

## 2020-01-30 PROCEDURE — G0463 HOSPITAL OUTPT CLINIC VISIT: HCPCS

## 2020-01-30 PROCEDURE — 36415 COLL VENOUS BLD VENIPUNCTURE: CPT

## 2020-01-30 ASSESSMENT — MIFFLIN-ST. JEOR: SCORE: 1712.97

## 2020-01-30 ASSESSMENT — PAIN SCALES - GENERAL: PAINLEVEL: NO PAIN (0)

## 2020-01-30 NOTE — PROGRESS NOTES
[facility]  [address] [place] [address]  Phone: [phone], Fax: [phone]      Outpatient Visit Note:    Patient: Zahida Grove  MRN: [id number]  : 1995  JD: 2020    Reason for Visit:  Follow up for acquired TTP    Forward History:  2018 with new anemia and thrombocytopenia to local hospital, initially thought to be ITP then TTP recognized and started on TPE.  HKYHLB75 deficiency confirmed and + inhibitor.  Treated with Pred 1 mg/kg/day  2018 found to have recurrent thrombocytopenia as an outpatient and readmitted for treatment of an exacerbation. Had not tapered steroids yet  Seen in consultation on 2018 at this clinic, planned to continue Pred 1 mg/kg until  and then 4 week taper of Prednisone to end Aug 13  Rituximab 375mg/m2 given weekly x 4, last dose 8/7/18  Aug 2018 [id number] 112%  2019 DCAJBM43 144%    Interval History: Zahida Grove is a 24 year old young woman with a history of acquired TTP who returns for routine follow up.  She reports that she has been doing well since the last time I saw her in 2019.  She has finished college but still lives at home with her parents and is working full time.  She notes no neurologic symptoms, vision changes, forgetfulness, bruising or bleeding.      Past Medical History:  Past Medical History:   Diagnosis Date     TTP (thrombotic thrombocytopenic purpura) (H)        Medications:  No current outpatient medications on file.      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  She reports no neurocognitive symptoms at this point and is overall feeling well.      We again discussed the 2 major problems that affect patients with acquired TTP: Relapse and neurocognitive dysfunction.  At this point I see no evidence of obvious neurocognitive disorder secondary to TTP and offered her reassurance about this.  I explained that relapse is much harder to evaluate.  Currently the best studied prognostic factor for relapses YIRWMZ57 activity and she is interested in performing this testing today.    Plan:  1. Majority of today's visit was spent counseling the patient regarding acquired TTP as above.   2. Counseled about signs and symptoms of relapse  3.  CBC as needed for concerning symptoms for relapse   4. CBC and HXWJGC20 for disease monitoring.  5.  She is interested in participating in our  study of neurocognitive dysfunction in TTP so she will enroll today.    The patient is given our center's contact information and is instructed to call if she should have any further questions or concerns.  Otherwise, we will plan on seeing her back in 6 months.      Total Time Spent:  I spent a total of 15 minutes face-to-face with Zahida Grove during today's office visit.  Over 50% of this time was spent counseling the patient and/or coordinating care regarding acquired TTP.      Damián Modi MD   of Medicine  [facility] [employer]

## 2020-01-31 LAB — MISCELLANEOUS TEST: NORMAL

## 2020-01-31 NOTE — NURSING NOTE
Ms. Grove is a 24 year old woman with iTTP (diagnosed 6/18/2018). Received TPE, steroids, and a course of Rituximab and has been in remission since.   Basic pathophysiology of iTTP discussed.  Signs and symptoms to observe for reviewed.  Laboratory monitoring discussed.  She is interested in the  study of neurocognitive dysfunction in TTP and will enroll today.   She will return to clinic in 6 months.  Ms. Grove has the Center's contact information and was encouraged to call with questions or concerns.

## 2020-02-03 LAB — LAB SCANNED RESULT: NORMAL

## 2020-02-23 ENCOUNTER — HEALTH MAINTENANCE LETTER (OUTPATIENT)
Age: 25
End: 2020-02-23

## 2020-09-25 ENCOUNTER — THERAPY VISIT (OUTPATIENT)
Dept: HEMATOLOGY | Facility: CLINIC | Age: 25
End: 2020-09-25
Attending: INTERNAL MEDICINE
Payer: COMMERCIAL

## 2020-09-25 VITALS
BODY MASS INDEX: 27.7 KG/M2 | DIASTOLIC BLOOD PRESSURE: 70 MMHG | OXYGEN SATURATION: 97 % | HEART RATE: 95 BPM | WEIGHT: 187 LBS | TEMPERATURE: 97.9 F | HEIGHT: 69 IN | SYSTOLIC BLOOD PRESSURE: 116 MMHG | RESPIRATION RATE: 12 BRPM

## 2020-09-25 DIAGNOSIS — Z86.2 HISTORY OF TTP (THROMBOTIC THROMBOCYTOPENIC PURPURA): Primary | ICD-10-CM

## 2020-09-25 DIAGNOSIS — Z86.2 HISTORY OF TTP (THROMBOTIC THROMBOCYTOPENIC PURPURA): ICD-10-CM

## 2020-09-25 LAB
BASOPHILS # BLD AUTO: 0 10E9/L (ref 0–0.2)
BASOPHILS NFR BLD AUTO: 0.9 %
DIFFERENTIAL METHOD BLD: ABNORMAL
EOSINOPHIL # BLD AUTO: 0.1 10E9/L (ref 0–0.7)
EOSINOPHIL NFR BLD AUTO: 3.1 %
ERYTHROCYTE [DISTWIDTH] IN BLOOD BY AUTOMATED COUNT: 13.1 % (ref 10–15)
HCT VFR BLD AUTO: 42.5 % (ref 35–47)
HGB BLD-MCNC: 14.6 G/DL (ref 11.7–15.7)
IMM GRANULOCYTES # BLD: 0 10E9/L (ref 0–0.4)
IMM GRANULOCYTES NFR BLD: 0.2 %
LDH SERPL L TO P-CCNC: 138 U/L (ref 81–234)
LYMPHOCYTES # BLD AUTO: 0.6 10E9/L (ref 0.8–5.3)
LYMPHOCYTES NFR BLD AUTO: 12.7 %
MCH RBC QN AUTO: 28.3 PG (ref 26.5–33)
MCHC RBC AUTO-ENTMCNC: 34.4 G/DL (ref 31.5–36.5)
MCV RBC AUTO: 83 FL (ref 78–100)
MONOCYTES # BLD AUTO: 0.5 10E9/L (ref 0–1.3)
MONOCYTES NFR BLD AUTO: 11.6 %
NEUTROPHILS # BLD AUTO: 3.3 10E9/L (ref 1.6–8.3)
NEUTROPHILS NFR BLD AUTO: 71.5 %
NRBC # BLD AUTO: 0 10*3/UL
NRBC BLD AUTO-RTO: 0 /100
PLATELET # BLD AUTO: 297 10E9/L (ref 150–450)
RBC # BLD AUTO: 5.15 10E12/L (ref 3.8–5.2)
WBC # BLD AUTO: 4.6 10E9/L (ref 4–11)

## 2020-09-25 PROCEDURE — 85397 CLOTTING FUNCT ACTIVITY: CPT | Performed by: INTERNAL MEDICINE

## 2020-09-25 PROCEDURE — 83615 LACTATE (LD) (LDH) ENZYME: CPT | Performed by: INTERNAL MEDICINE

## 2020-09-25 PROCEDURE — 40000937 ZZHCL STATISTIC RESEARCH KIT COLLECTION: Performed by: INTERNAL MEDICINE

## 2020-09-25 PROCEDURE — 85025 COMPLETE CBC W/AUTO DIFF WBC: CPT | Performed by: INTERNAL MEDICINE

## 2020-09-25 ASSESSMENT — MIFFLIN-ST. JEOR: SCORE: 1662.61

## 2020-09-25 ASSESSMENT — PAIN SCALES - GENERAL: PAINLEVEL: NO PAIN (0)

## 2020-09-25 NOTE — PROGRESS NOTES
I met with Zahida Grove  today in a screening visit for the [place] P200 study, an observational registry for TTP.  She has been doing well since I saw her  last and she meets eligibility criteria to enter the study.    We carefully reviewed the risks, benefits and alternatives to enrollment and that she may exist the study at any time.  We carefully reviewed each page of the consent form and she would like to participate.  We also carefully reviewed the HIPAA waiver consent form as well.  she clearly has capacity to consent - understands the study including his obligations, risks and benefits and wishes to proceed.  Consent was signed and she was given a copy.    Damián Modi MD   of Medicine, Division of Hematology, Oncology and Transplantation  [employer] [facility] [employer]

## 2020-09-26 LAB — RESEARCH KIT COLLECTION: NORMAL

## 2020-09-29 LAB — LAB SCANNED RESULT: NORMAL

## 2020-12-03 DIAGNOSIS — Z86.2 HISTORY OF TTP (THROMBOTIC THROMBOCYTOPENIC PURPURA): Primary | ICD-10-CM

## 2020-12-12 ENCOUNTER — HEALTH MAINTENANCE LETTER (OUTPATIENT)
Age: 25
End: 2020-12-12

## 2020-12-16 ENCOUNTER — THERAPY VISIT (OUTPATIENT)
Dept: HEMATOLOGY | Facility: CLINIC | Age: 25
End: 2020-12-16
Attending: INTERNAL MEDICINE
Payer: COMMERCIAL

## 2020-12-16 VITALS
TEMPERATURE: 97.9 F | WEIGHT: 202 LBS | SYSTOLIC BLOOD PRESSURE: 137 MMHG | OXYGEN SATURATION: 96 % | DIASTOLIC BLOOD PRESSURE: 87 MMHG | HEART RATE: 72 BPM | BODY MASS INDEX: 29.92 KG/M2 | RESPIRATION RATE: 16 BRPM | HEIGHT: 69 IN

## 2020-12-16 DIAGNOSIS — Z86.2 HISTORY OF TTP (THROMBOTIC THROMBOCYTOPENIC PURPURA): ICD-10-CM

## 2020-12-16 DIAGNOSIS — Z86.2 HISTORY OF TTP (THROMBOTIC THROMBOCYTOPENIC PURPURA): Primary | ICD-10-CM

## 2020-12-16 LAB
BASOPHILS # BLD AUTO: 0.1 10E9/L (ref 0–0.2)
BASOPHILS NFR BLD AUTO: 0.9 %
DIFFERENTIAL METHOD BLD: ABNORMAL
EOSINOPHIL # BLD AUTO: 0.2 10E9/L (ref 0–0.7)
EOSINOPHIL NFR BLD AUTO: 2.7 %
ERYTHROCYTE [DISTWIDTH] IN BLOOD BY AUTOMATED COUNT: 12.7 % (ref 10–15)
HCT VFR BLD AUTO: 42.4 % (ref 35–47)
HGB BLD-MCNC: 14.1 G/DL (ref 11.7–15.7)
IMM GRANULOCYTES # BLD: 0 10E9/L (ref 0–0.4)
IMM GRANULOCYTES NFR BLD: 0.2 %
LDH SERPL L TO P-CCNC: 177 U/L (ref 81–234)
LYMPHOCYTES # BLD AUTO: 0.6 10E9/L (ref 0.8–5.3)
LYMPHOCYTES NFR BLD AUTO: 10.7 %
MCH RBC QN AUTO: 27.5 PG (ref 26.5–33)
MCHC RBC AUTO-ENTMCNC: 33.3 G/DL (ref 31.5–36.5)
MCV RBC AUTO: 83 FL (ref 78–100)
MONOCYTES # BLD AUTO: 0.5 10E9/L (ref 0–1.3)
MONOCYTES NFR BLD AUTO: 8.9 %
NEUTROPHILS # BLD AUTO: 4.3 10E9/L (ref 1.6–8.3)
NEUTROPHILS NFR BLD AUTO: 76.6 %
NRBC # BLD AUTO: 0 10*3/UL
NRBC BLD AUTO-RTO: 0 /100
PLATELET # BLD AUTO: 335 10E9/L (ref 150–450)
RBC # BLD AUTO: 5.12 10E12/L (ref 3.8–5.2)
WBC # BLD AUTO: 5.6 10E9/L (ref 4–11)

## 2020-12-16 PROCEDURE — 85025 COMPLETE CBC W/AUTO DIFF WBC: CPT | Performed by: INTERNAL MEDICINE

## 2020-12-16 PROCEDURE — 999N001121 HC STATISTIC RESEARCH KIT COLLECTION: Performed by: INTERNAL MEDICINE

## 2020-12-16 PROCEDURE — 83615 LACTATE (LD) (LDH) ENZYME: CPT | Performed by: INTERNAL MEDICINE

## 2020-12-16 PROCEDURE — 99001 SPECIMEN HANDLING PT-LAB: CPT | Performed by: INTERNAL MEDICINE

## 2020-12-16 PROCEDURE — G0463 HOSPITAL OUTPT CLINIC VISIT: HCPCS

## 2020-12-16 PROCEDURE — 85397 CLOTTING FUNCT ACTIVITY: CPT | Performed by: INTERNAL MEDICINE

## 2020-12-16 PROCEDURE — 99213 OFFICE O/P EST LOW 20 MIN: CPT | Performed by: INTERNAL MEDICINE

## 2020-12-16 ASSESSMENT — PAIN SCALES - GENERAL: PAINLEVEL: NO PAIN (0)

## 2020-12-16 ASSESSMENT — MIFFLIN-ST. JEOR: SCORE: 1730.65

## 2020-12-17 LAB — RESEARCH KIT COLLECTION: NORMAL

## 2020-12-18 LAB — LAB SCANNED RESULT: NORMAL

## 2020-12-20 NOTE — PROGRESS NOTES
[facility]  [address] [place] [address]  Phone: [phone], Fax: [phone]      Outpatient Visit Note:    Patient: Zahida Grove  MRN: [id number]  : 1995  JD: Dec 16, 2020    Reason for Visit:  Follow up for acquired TTP    Forward History:  2018 with new anemia and thrombocytopenia to local hospital, initially thought to be ITP then TTP recognized and started on TPE.  YGQUZN27 deficiency confirmed and + inhibitor.  Treated with Pred 1 mg/kg/day  2018 found to have recurrent thrombocytopenia as an outpatient and readmitted for treatment of an exacerbation. Had not tapered steroids yet  Seen in consultation on 2018 at this clinic, planned to continue Pred 1 mg/kg until  and then 4 week taper of Prednisone to end Aug 13  Rituximab 375mg/m2 given weekly x 4, last dose 8/7/18  Aug 2018 PDIYXW26 112%  2019 DXMIFW83 144%  2020 WPWRTR14 129%  2020 PLHQUA70 107%    Interval History: Zahida Grove is a 24 year old young woman with a history of acquired TTP who returns for routine follow up.  She reports that she has been doing well since the last time I saw her. She reports no problems with memory, cognition, depression or anxiety. She is now living with friends after many months of living at home during the COVID-19 pandemic. She does report that she had COVID-19 but had a mild very mild course. Overall, she is feeling well today. She is anticipating traveling to [place] to work remotely for several weeks this winter.      Past Medical History:  Past Medical History:   Diagnosis Date     TTP (thrombotic thrombocytopenic purpura) (H)        Medications:  No current outpatient medications on file.      Allergies:  No Known Allergies    ROS:  A 14 point ROS is negative except as stated in the HPI    Objective:  Vitals: B/P: 120/83, T: 98.6, P: 66, R: 12, Wt: 202 lbs 0 oz  Exam:   Gen: Appears well, no  distress  Remainder of today's exam is deferred as the visit is primarily for counseling    Labs:  pending    Imaging:  None    Assessment:  In summary, Zahida Grove is a 24 year old woman with acquired TTP now in durable clinical and biochemical remission, after rituximab.  She reports no neurocognitive symptoms at this point and is overall feeling well. Today, we discussed emerging evidence about risk for heart attack and stroke being higher in patients who have had a history of TTP. She is potentially interested in seeing preventive cardiology at some point.    Plan:  1. Majority of today's visit was spent counseling the patient regarding acquired TTP as above.   2. Counseled about signs and symptoms of relapse  3.  CBC as needed for concerning symptoms for relapse   4. CBC and MQCTKY35 for disease monitoring.  5.  She is enrolled in our study of cognition after TTP and the [place] registry    The patient is given our center's contact information and is instructed to call if she should have any further questions or concerns.  Otherwise, we will plan on seeing her back in 6 months.      Total Time Spent:  I spent a total of 15 minutes face-to-face with Zahida Grove during today's office visit.  Over 50% of this time was spent counseling the patient and/or coordinating care regarding acquired TTP.      Damián Modi MD   of Medicine  [facility] [employer]

## 2021-03-18 ENCOUNTER — ALLIED HEALTH/NURSE VISIT (OUTPATIENT)
Dept: HEMATOLOGY | Facility: CLINIC | Age: 26
End: 2021-03-18
Payer: COMMERCIAL

## 2021-03-18 VITALS
HEIGHT: 71 IN | SYSTOLIC BLOOD PRESSURE: 136 MMHG | BODY MASS INDEX: 28.5 KG/M2 | TEMPERATURE: 97.7 F | DIASTOLIC BLOOD PRESSURE: 86 MMHG | OXYGEN SATURATION: 97 % | HEART RATE: 67 BPM | WEIGHT: 203.6 LBS

## 2021-03-18 DIAGNOSIS — Z86.2 HISTORY OF TTP (THROMBOTIC THROMBOCYTOPENIC PURPURA): Primary | ICD-10-CM

## 2021-03-18 PROCEDURE — 36415 COLL VENOUS BLD VENIPUNCTURE: CPT

## 2021-03-18 ASSESSMENT — MIFFLIN-ST. JEOR: SCORE: 1766.47

## 2021-03-18 ASSESSMENT — PAIN SCALES - GENERAL: PAINLEVEL: NO PAIN (0)

## 2021-04-11 ENCOUNTER — HEALTH MAINTENANCE LETTER (OUTPATIENT)
Age: 26
End: 2021-04-11

## 2021-07-01 ENCOUNTER — OFFICE VISIT (OUTPATIENT)
Dept: HEMATOLOGY | Facility: CLINIC | Age: 26
End: 2021-07-01
Attending: INTERNAL MEDICINE
Payer: COMMERCIAL

## 2021-07-01 VITALS
RESPIRATION RATE: 14 BRPM | OXYGEN SATURATION: 99 % | TEMPERATURE: 97.5 F | HEART RATE: 62 BPM | DIASTOLIC BLOOD PRESSURE: 76 MMHG | WEIGHT: 201 LBS | SYSTOLIC BLOOD PRESSURE: 123 MMHG | BODY MASS INDEX: 29.77 KG/M2 | HEIGHT: 69 IN

## 2021-07-01 DIAGNOSIS — Z86.2 HISTORY OF TTP (THROMBOTIC THROMBOCYTOPENIC PURPURA): Primary | ICD-10-CM

## 2021-07-01 DIAGNOSIS — Z86.2 HISTORY OF TTP (THROMBOTIC THROMBOCYTOPENIC PURPURA): ICD-10-CM

## 2021-07-01 LAB
BASOPHILS # BLD AUTO: 0.1 10E9/L (ref 0–0.2)
BASOPHILS NFR BLD AUTO: 1.1 %
DIFFERENTIAL METHOD BLD: ABNORMAL
EOSINOPHIL # BLD AUTO: 0.1 10E9/L (ref 0–0.7)
EOSINOPHIL NFR BLD AUTO: 2.2 %
ERYTHROCYTE [DISTWIDTH] IN BLOOD BY AUTOMATED COUNT: 12.7 % (ref 10–15)
HCT VFR BLD AUTO: 41.9 % (ref 35–47)
HGB BLD-MCNC: 14.1 G/DL (ref 11.7–15.7)
IMM GRANULOCYTES # BLD: 0 10E9/L (ref 0–0.4)
IMM GRANULOCYTES NFR BLD: 0.2 %
LDH SERPL L TO P-CCNC: 163 U/L (ref 81–234)
LYMPHOCYTES # BLD AUTO: 0.6 10E9/L (ref 0.8–5.3)
LYMPHOCYTES NFR BLD AUTO: 9.2 %
MCH RBC QN AUTO: 27.6 PG (ref 26.5–33)
MCHC RBC AUTO-ENTMCNC: 33.7 G/DL (ref 31.5–36.5)
MCV RBC AUTO: 82 FL (ref 78–100)
MONOCYTES # BLD AUTO: 0.7 10E9/L (ref 0–1.3)
MONOCYTES NFR BLD AUTO: 10 %
NEUTROPHILS # BLD AUTO: 5 10E9/L (ref 1.6–8.3)
NEUTROPHILS NFR BLD AUTO: 77.3 %
NRBC # BLD AUTO: 0 10*3/UL
NRBC BLD AUTO-RTO: 0 /100
PLATELET # BLD AUTO: 337 10E9/L (ref 150–450)
RBC # BLD AUTO: 5.1 10E12/L (ref 3.8–5.2)
WBC # BLD AUTO: 6.5 10E9/L (ref 4–11)

## 2021-07-01 PROCEDURE — 99214 OFFICE O/P EST MOD 30 MIN: CPT | Performed by: INTERNAL MEDICINE

## 2021-07-01 PROCEDURE — 85025 COMPLETE CBC W/AUTO DIFF WBC: CPT | Performed by: INTERNAL MEDICINE

## 2021-07-01 PROCEDURE — 85397 CLOTTING FUNCT ACTIVITY: CPT | Performed by: INTERNAL MEDICINE

## 2021-07-01 PROCEDURE — 83615 LACTATE (LD) (LDH) ENZYME: CPT | Performed by: INTERNAL MEDICINE

## 2021-07-01 PROCEDURE — 36415 COLL VENOUS BLD VENIPUNCTURE: CPT

## 2021-07-01 PROCEDURE — G0463 HOSPITAL OUTPT CLINIC VISIT: HCPCS

## 2021-07-01 ASSESSMENT — MIFFLIN-ST. JEOR: SCORE: 1726.11

## 2021-07-01 ASSESSMENT — PAIN SCALES - GENERAL: PAINLEVEL: NO PAIN (0)

## 2021-07-01 NOTE — PROGRESS NOTES
[facility]  [address] [place] [address]  Phone: [phone], Fax: [phone]      Outpatient Visit Note:    Patient: Zahida Grove  MRN: [id number]  : 1995  JD: 2021    Zahida Grove is a 25 year old woman with a history of immune-mediated TTP who returns for routine follow up.      Assessment:  In summary, Zahida Grove is a 25 year old woman with immune-mediated TTP, currently in clinical and ETXRTJ29 remission with mild but persistent cognitive symptoms.  She is interested in DJTQYX50 monitoring and potentially preventive therapy so we will proceed with lab testing today.    Recommendations:  1. Offered reassurance that her symptoms are common after iTTP  2. CBC, LDH and XRTQCQ36 testing for monitoring today  3. Consider prophylactic immunosuppression for ATPHVJ45 deficiency - we will call with results  4. If JDLFLP46 is normal then return to clinic in 3 months.  5. Encouraged her to get a COVID-19 vaccination and offered monitoring of THNPKO42 if she does get vaccinated      35 minutes spent on the date of the encounter doing chart review, review of test results, interpretation of tests, patient visit and documentation       Damián Modi MD   of Medicine, Division of Hematology, Oncology and Transplantation  [employer]     --------------------------------------------------------  Forward History:  2018 with new anemia and thrombocytopenia to local hospital, initially thought to be ITP then TTP recognized and started on TPE.  PGKINV85 deficiency confirmed and + inhibitor.  Treated with Pred 1 mg/kg/day  2018 found to have recurrent thrombocytopenia as an outpatient and readmitted for treatment of an exacerbation. Had not tapered steroids yet  Seen in consultation on 2018 at this clinic, planned to continue Pred 1 mg/kg until  and then 4 week taper of  Prednisone to end Aug 13  Rituximab 375mg/m2 given weekly x 4, last dose 8/7/18  Aug 2018 ENLZKU80 112%  July 2019 IJAYGL49 144%  Jan 2020 HJLJFH00 129%  Sept 2020 WZFDTI73 107%  Dec 2020 FTCXTL83 86%  Mar 2021 FRCWJB91 71%    History: Moody Grove is a 25 year old young woman who is otherwise healthy but had severe episode of immune mediated TTP in 2018 and has remained in clinical and YNRFZY43 remission since then.  She reports she is doing well today but she has had problems with memory and concentration more lately.  She reports that she took a new job several months ago which has been quite demanding.  The work is all remote as well.  She did have COVID-19 infection which was mild and has not had a vaccine, partially due to fear of having a severe reaction or other hematologic complications.  We discussed the vaccine safety track record today in clinic.    Given her ongoing problems with memory and concentration, she decided she would like to have IHPZIJ30 testing in addition to her CBC and LDH for TTP monitoring.  She would be interested in prophylactic immunosuppression of her IEFNEP39 is deficient.    Objective:  Vitals: B/P: 123/76, T: 97.5, P: 62, R: 14, Wt: 201 lbs 0 oz  Exam:   Gen: Appears well, no distress  HEENT: no scleral icterus or hemorrhage, no wet purpura, no lymphadenopathy  Ext: no edema  Neuro: no deficients and affect is normal     Labs:  Results for MOODY GROVE (MRN [id number]) as of 7/1/2021 15:16   Ref. Range 7/1/2021 12:05   Lactate Dehydrogenase Latest Ref Range: 81 - 234 U/L 163   WBC Latest Ref Range: 4.0 - 11.0 10e9/L 6.5   Hemoglobin Latest Ref Range: 11.7 - 15.7 g/dL 14.1   Hematocrit Latest Ref Range: 35.0 - 47.0 % 41.9   Platelet Count Latest Ref Range: 150 - 450 10e9/L 337       Imaging:  none

## 2021-07-02 LAB — LAB SCANNED RESULT: ABNORMAL

## 2021-07-08 ENCOUNTER — TELEPHONE (OUTPATIENT)
Dept: HEMATOLOGY | Facility: CLINIC | Age: 26
End: 2021-07-08

## 2021-07-08 DIAGNOSIS — Z86.2 HISTORY OF TTP (THROMBOTIC THROMBOCYTOPENIC PURPURA): Primary | ICD-10-CM

## 2021-07-08 NOTE — TELEPHONE ENCOUNTER
Left message confidential voice mail for Zahida DODSON [place] to let her know that her lab results show that her LMPJAY62 level is now 42, which is down from 3 months ago.  Dr. Modi would like these rechecked in a few weeks.  Orders are in placed.  I asked Zahida to call our center to schedule a lab visit.    Isabell Duggan RN - Nurse Clinician - [facility] - [phone]

## 2021-07-12 ENCOUNTER — TELEPHONE (OUTPATIENT)
Dept: HEMATOLOGY | Facility: CLINIC | Age: 26
End: 2021-07-12

## 2021-07-30 ENCOUNTER — ALLIED HEALTH/NURSE VISIT (OUTPATIENT)
Dept: HEMATOLOGY | Facility: CLINIC | Age: 26
End: 2021-07-30
Payer: COMMERCIAL

## 2021-07-30 DIAGNOSIS — Z86.2 HISTORY OF TTP (THROMBOTIC THROMBOCYTOPENIC PURPURA): ICD-10-CM

## 2021-07-30 LAB
BASOPHILS # BLD AUTO: 0.1 10E3/UL (ref 0–0.2)
BASOPHILS NFR BLD AUTO: 1 %
EOSINOPHIL # BLD AUTO: 0.2 10E3/UL (ref 0–0.7)
EOSINOPHIL NFR BLD AUTO: 3 %
ERYTHROCYTE [DISTWIDTH] IN BLOOD BY AUTOMATED COUNT: 12.5 % (ref 10–15)
HCT VFR BLD AUTO: 40.6 % (ref 35–47)
HGB BLD-MCNC: 13.8 G/DL (ref 11.7–15.7)
IMM GRANULOCYTES # BLD: 0 10E3/UL
IMM GRANULOCYTES NFR BLD: 0 %
LDH SERPL L TO P-CCNC: 230 U/L (ref 81–234)
LYMPHOCYTES # BLD AUTO: 0.6 10E3/UL (ref 0.8–5.3)
LYMPHOCYTES NFR BLD AUTO: 11 %
MCH RBC QN AUTO: 28.6 PG (ref 26.5–33)
MCHC RBC AUTO-ENTMCNC: 34 G/DL (ref 31.5–36.5)
MCV RBC AUTO: 84 FL (ref 78–100)
MONOCYTES # BLD AUTO: 0.6 10E3/UL (ref 0–1.3)
MONOCYTES NFR BLD AUTO: 10 %
NEUTROPHILS # BLD AUTO: 4 10E3/UL (ref 1.6–8.3)
NEUTROPHILS NFR BLD AUTO: 75 %
NRBC # BLD AUTO: 0 10E3/UL
NRBC BLD AUTO-RTO: 0 /100
PLATELET # BLD AUTO: 320 10E3/UL (ref 150–450)
RBC # BLD AUTO: 4.83 10E6/UL (ref 3.8–5.2)
WBC # BLD AUTO: 5.5 10E3/UL (ref 4–11)

## 2021-07-30 PROCEDURE — 83615 LACTATE (LD) (LDH) ENZYME: CPT

## 2021-07-30 PROCEDURE — 85397 CLOTTING FUNCT ACTIVITY: CPT

## 2021-07-30 PROCEDURE — 36415 COLL VENOUS BLD VENIPUNCTURE: CPT

## 2021-07-30 PROCEDURE — 85025 COMPLETE CBC W/AUTO DIFF WBC: CPT

## 2021-08-04 LAB — SCANNED LAB RESULT: ABNORMAL

## 2021-08-18 ENCOUNTER — TELEPHONE (OUTPATIENT)
Dept: HEMATOLOGY | Facility: CLINIC | Age: 26
End: 2021-08-18

## 2021-08-18 DIAGNOSIS — M31.19 T.T.P. SYNDROME (H): Primary | ICD-10-CM

## 2021-08-18 NOTE — TELEPHONE ENCOUNTER
Ms. Grove, a 25 year old woman with iTTP called to schedule a lab appointment for follow up on a ADAMTS-13 activity of 42% from 7/30/2021. She has scheduled a lab draw for 8/20 /2021. Orders placed for ADAMTS-13 activity with reflex, CBC with platelet, and LDH.

## 2021-08-20 ENCOUNTER — ALLIED HEALTH/NURSE VISIT (OUTPATIENT)
Dept: HEMATOLOGY | Facility: CLINIC | Age: 26
End: 2021-08-20
Payer: COMMERCIAL

## 2021-08-20 DIAGNOSIS — M31.19 T.T.P. SYNDROME (H): ICD-10-CM

## 2021-08-20 LAB
ERYTHROCYTE [DISTWIDTH] IN BLOOD BY AUTOMATED COUNT: 12.2 % (ref 10–15)
HCT VFR BLD AUTO: 43.2 % (ref 35–47)
HGB BLD-MCNC: 15 G/DL (ref 11.7–15.7)
LDH SERPL L TO P-CCNC: 170 U/L (ref 81–234)
MCH RBC QN AUTO: 28.5 PG (ref 26.5–33)
MCHC RBC AUTO-ENTMCNC: 34.7 G/DL (ref 31.5–36.5)
MCV RBC AUTO: 82 FL (ref 78–100)
PLATELET # BLD AUTO: 346 10E3/UL (ref 150–450)
RBC # BLD AUTO: 5.26 10E6/UL (ref 3.8–5.2)
WBC # BLD AUTO: 6.4 10E3/UL (ref 4–11)

## 2021-08-20 PROCEDURE — 85027 COMPLETE CBC AUTOMATED: CPT

## 2021-08-20 PROCEDURE — 36415 COLL VENOUS BLD VENIPUNCTURE: CPT

## 2021-08-20 PROCEDURE — 85397 CLOTTING FUNCT ACTIVITY: CPT

## 2021-08-20 PROCEDURE — 83615 LACTATE (LD) (LDH) ENZYME: CPT

## 2021-08-26 LAB — SCANNED LAB RESULT: ABNORMAL

## 2021-09-24 ENCOUNTER — ALLIED HEALTH/NURSE VISIT (OUTPATIENT)
Dept: HEMATOLOGY | Facility: CLINIC | Age: 26
End: 2021-09-24
Payer: COMMERCIAL

## 2021-09-24 DIAGNOSIS — M31.19 T.T.P. SYNDROME (H): ICD-10-CM

## 2021-09-24 DIAGNOSIS — M31.19 T.T.P. SYNDROME (H): Primary | ICD-10-CM

## 2021-09-24 LAB
BASOPHILS # BLD AUTO: 0.1 10E3/UL (ref 0–0.2)
BASOPHILS NFR BLD AUTO: 1 %
EOSINOPHIL # BLD AUTO: 0.1 10E3/UL (ref 0–0.7)
EOSINOPHIL NFR BLD AUTO: 2 %
ERYTHROCYTE [DISTWIDTH] IN BLOOD BY AUTOMATED COUNT: 12.3 % (ref 10–15)
HCT VFR BLD AUTO: 42.5 % (ref 35–47)
HGB BLD-MCNC: 14.5 G/DL (ref 11.7–15.7)
IMM GRANULOCYTES # BLD: 0 10E3/UL
IMM GRANULOCYTES NFR BLD: 0 %
LDH SERPL L TO P-CCNC: 184 U/L (ref 81–234)
LYMPHOCYTES # BLD AUTO: 0.8 10E3/UL (ref 0.8–5.3)
LYMPHOCYTES NFR BLD AUTO: 15 %
MCH RBC QN AUTO: 27.9 PG (ref 26.5–33)
MCHC RBC AUTO-ENTMCNC: 34.1 G/DL (ref 31.5–36.5)
MCV RBC AUTO: 82 FL (ref 78–100)
MONOCYTES # BLD AUTO: 0.5 10E3/UL (ref 0–1.3)
MONOCYTES NFR BLD AUTO: 10 %
NEUTROPHILS # BLD AUTO: 3.9 10E3/UL (ref 1.6–8.3)
NEUTROPHILS NFR BLD AUTO: 72 %
NRBC # BLD AUTO: 0 10E3/UL
NRBC BLD AUTO-RTO: 0 /100
PLATELET # BLD AUTO: 356 10E3/UL (ref 150–450)
RBC # BLD AUTO: 5.2 10E6/UL (ref 3.8–5.2)
WBC # BLD AUTO: 5.4 10E3/UL (ref 4–11)

## 2021-09-24 PROCEDURE — 83615 LACTATE (LD) (LDH) ENZYME: CPT

## 2021-09-24 PROCEDURE — 85397 CLOTTING FUNCT ACTIVITY: CPT

## 2021-09-24 PROCEDURE — 85025 COMPLETE CBC W/AUTO DIFF WBC: CPT

## 2021-09-24 PROCEDURE — 36415 COLL VENOUS BLD VENIPUNCTURE: CPT

## 2021-09-24 NOTE — PROGRESS NOTES
Zahida Grove  [id number]  1995    Zahida Grove called today stating that she had 2 quarter size clots in her period. She had much larger clots with period when she presented with TTP. It has been about a month since her last labs and they have been drifting down. She will come in for labs today.  Alerted Dr. Modi.  Yessica Carty, MSN, RN, PHN -Nurse Clinician, [facility] [phone]

## 2021-09-26 ENCOUNTER — HEALTH MAINTENANCE LETTER (OUTPATIENT)
Age: 26
End: 2021-09-26

## 2021-09-29 LAB — SCANNED LAB RESULT: ABNORMAL

## 2021-11-19 ENCOUNTER — THERAPY VISIT (OUTPATIENT)
Dept: HEMATOLOGY | Facility: CLINIC | Age: 26
End: 2021-11-19
Attending: INTERNAL MEDICINE
Payer: COMMERCIAL

## 2021-11-19 DIAGNOSIS — M31.19 T.T.P. SYNDROME (H): ICD-10-CM

## 2021-11-19 DIAGNOSIS — M31.19 T.T.P. SYNDROME (H): Primary | ICD-10-CM

## 2021-11-19 LAB
BASOPHILS # BLD AUTO: 0.1 10E3/UL (ref 0–0.2)
BASOPHILS NFR BLD AUTO: 1 %
EOSINOPHIL # BLD AUTO: 0.1 10E3/UL (ref 0–0.7)
EOSINOPHIL NFR BLD AUTO: 2 %
ERYTHROCYTE [DISTWIDTH] IN BLOOD BY AUTOMATED COUNT: 12.8 % (ref 10–15)
HCT VFR BLD AUTO: 43.4 % (ref 35–47)
HGB BLD-MCNC: 14.8 G/DL (ref 11.7–15.7)
IMM GRANULOCYTES # BLD: 0 10E3/UL
IMM GRANULOCYTES NFR BLD: 0 %
LDH SERPL L TO P-CCNC: 181 U/L (ref 81–234)
LYMPHOCYTES # BLD AUTO: 0.4 10E3/UL (ref 0.8–5.3)
LYMPHOCYTES NFR BLD AUTO: 7 %
MCH RBC QN AUTO: 28.2 PG (ref 26.5–33)
MCHC RBC AUTO-ENTMCNC: 34.1 G/DL (ref 31.5–36.5)
MCV RBC AUTO: 83 FL (ref 78–100)
MONOCYTES # BLD AUTO: 0.4 10E3/UL (ref 0–1.3)
MONOCYTES NFR BLD AUTO: 8 %
NEUTROPHILS # BLD AUTO: 4.6 10E3/UL (ref 1.6–8.3)
NEUTROPHILS NFR BLD AUTO: 82 %
NRBC # BLD AUTO: 0 10E3/UL
NRBC BLD AUTO-RTO: 0 /100
PLATELET # BLD AUTO: 356 10E3/UL (ref 150–450)
RBC # BLD AUTO: 5.24 10E6/UL (ref 3.8–5.2)
WBC # BLD AUTO: 5.7 10E3/UL (ref 4–11)

## 2021-11-19 PROCEDURE — 85397 CLOTTING FUNCT ACTIVITY: CPT | Performed by: INTERNAL MEDICINE

## 2021-11-19 PROCEDURE — 99001 SPECIMEN HANDLING PT-LAB: CPT | Performed by: INTERNAL MEDICINE

## 2021-11-19 PROCEDURE — 36415 COLL VENOUS BLD VENIPUNCTURE: CPT | Performed by: INTERNAL MEDICINE

## 2021-11-19 PROCEDURE — 83615 LACTATE (LD) (LDH) ENZYME: CPT | Performed by: INTERNAL MEDICINE

## 2021-11-19 PROCEDURE — 85025 COMPLETE CBC W/AUTO DIFF WBC: CPT | Performed by: INTERNAL MEDICINE

## 2021-11-24 LAB — SCANNED LAB RESULT: ABNORMAL

## 2021-12-15 ENCOUNTER — LAB (OUTPATIENT)
Dept: LAB | Facility: CLINIC | Age: 26
End: 2021-12-15
Payer: COMMERCIAL

## 2021-12-15 DIAGNOSIS — M31.19 T.T.P. SYNDROME (H): ICD-10-CM

## 2021-12-15 LAB
BASOPHILS # BLD AUTO: 0 10E3/UL (ref 0–0.2)
BASOPHILS NFR BLD AUTO: 1 %
EOSINOPHIL # BLD AUTO: 0.1 10E3/UL (ref 0–0.7)
EOSINOPHIL NFR BLD AUTO: 3 %
ERYTHROCYTE [DISTWIDTH] IN BLOOD BY AUTOMATED COUNT: 13.2 % (ref 10–15)
HCT VFR BLD AUTO: 42.5 % (ref 35–47)
HGB BLD-MCNC: 14.6 G/DL (ref 11.7–15.7)
LDH SERPL L TO P-CCNC: 187 U/L (ref 81–234)
LYMPHOCYTES # BLD AUTO: 0.5 10E3/UL (ref 0.8–5.3)
LYMPHOCYTES NFR BLD AUTO: 11 %
MCH RBC QN AUTO: 28.4 PG (ref 26.5–33)
MCHC RBC AUTO-ENTMCNC: 34.4 G/DL (ref 31.5–36.5)
MCV RBC AUTO: 83 FL (ref 78–100)
MONOCYTES # BLD AUTO: 0.7 10E3/UL (ref 0–1.3)
MONOCYTES NFR BLD AUTO: 16 %
NEUTROPHILS # BLD AUTO: 2.8 10E3/UL (ref 1.6–8.3)
NEUTROPHILS NFR BLD AUTO: 69 %
PLATELET # BLD AUTO: 297 10E3/UL (ref 150–450)
RBC # BLD AUTO: 5.14 10E6/UL (ref 3.8–5.2)
WBC # BLD AUTO: 4 10E3/UL (ref 4–11)

## 2021-12-15 PROCEDURE — 99000 SPECIMEN HANDLING OFFICE-LAB: CPT

## 2021-12-15 PROCEDURE — 36415 COLL VENOUS BLD VENIPUNCTURE: CPT | Mod: 90

## 2021-12-15 PROCEDURE — 85397 CLOTTING FUNCT ACTIVITY: CPT | Mod: 90

## 2021-12-15 PROCEDURE — 85025 COMPLETE CBC W/AUTO DIFF WBC: CPT

## 2021-12-15 PROCEDURE — 83615 LACTATE (LD) (LDH) ENZYME: CPT

## 2021-12-20 LAB — SCANNED LAB RESULT: ABNORMAL

## 2021-12-30 DIAGNOSIS — M31.19 ACQUIRED TTP (H): Primary | ICD-10-CM

## 2021-12-30 RX ORDER — DIPHENHYDRAMINE HCL 50 MG
50 CAPSULE ORAL ONCE
Status: CANCELLED
Start: 2021-12-30

## 2021-12-30 RX ORDER — EPINEPHRINE 1 MG/ML
0.3 INJECTION, SOLUTION, CONCENTRATE INTRAVENOUS EVERY 5 MIN PRN
Status: CANCELLED | OUTPATIENT
Start: 2021-12-30

## 2021-12-30 RX ORDER — HEPARIN SODIUM (PORCINE) LOCK FLUSH IV SOLN 100 UNIT/ML 100 UNIT/ML
5 SOLUTION INTRAVENOUS
Status: CANCELLED | OUTPATIENT
Start: 2021-12-30

## 2021-12-30 RX ORDER — MEPERIDINE HYDROCHLORIDE 25 MG/ML
25 INJECTION INTRAMUSCULAR; INTRAVENOUS; SUBCUTANEOUS EVERY 30 MIN PRN
Status: CANCELLED | OUTPATIENT
Start: 2021-12-30

## 2021-12-30 RX ORDER — DIPHENHYDRAMINE HYDROCHLORIDE 50 MG/ML
50 INJECTION INTRAMUSCULAR; INTRAVENOUS
Status: CANCELLED
Start: 2021-12-30

## 2021-12-30 RX ORDER — ALBUTEROL SULFATE 0.83 MG/ML
2.5 SOLUTION RESPIRATORY (INHALATION)
Status: CANCELLED | OUTPATIENT
Start: 2021-12-30

## 2021-12-30 RX ORDER — ALBUTEROL SULFATE 90 UG/1
1-2 AEROSOL, METERED RESPIRATORY (INHALATION)
Status: CANCELLED
Start: 2021-12-30

## 2021-12-30 RX ORDER — ACETAMINOPHEN 325 MG/1
650 TABLET ORAL ONCE
Status: CANCELLED
Start: 2021-12-30

## 2021-12-30 RX ORDER — HEPARIN SODIUM,PORCINE 10 UNIT/ML
5 VIAL (ML) INTRAVENOUS
Status: CANCELLED | OUTPATIENT
Start: 2021-12-30

## 2021-12-30 RX ORDER — NALOXONE HYDROCHLORIDE 0.4 MG/ML
0.2 INJECTION, SOLUTION INTRAMUSCULAR; INTRAVENOUS; SUBCUTANEOUS
Status: CANCELLED | OUTPATIENT
Start: 2021-12-30

## 2022-01-03 DIAGNOSIS — Z11.59 ENCOUNTER FOR SCREENING FOR OTHER VIRAL DISEASES: Primary | ICD-10-CM

## 2022-01-18 ENCOUNTER — LAB (OUTPATIENT)
Dept: LAB | Facility: CLINIC | Age: 27
End: 2022-01-18
Attending: INTERNAL MEDICINE
Payer: COMMERCIAL

## 2022-01-18 DIAGNOSIS — Z11.59 ENCOUNTER FOR SCREENING FOR OTHER VIRAL DISEASES: ICD-10-CM

## 2022-01-18 PROCEDURE — U0005 INFEC AGEN DETEC AMPLI PROBE: HCPCS

## 2022-01-18 PROCEDURE — U0003 INFECTIOUS AGENT DETECTION BY NUCLEIC ACID (DNA OR RNA); SEVERE ACUTE RESPIRATORY SYNDROME CORONAVIRUS 2 (SARS-COV-2) (CORONAVIRUS DISEASE [COVID-19]), AMPLIFIED PROBE TECHNIQUE, MAKING USE OF HIGH THROUGHPUT TECHNOLOGIES AS DESCRIBED BY CMS-2020-01-R: HCPCS

## 2022-01-19 LAB — SARS-COV-2 RNA RESP QL NAA+PROBE: NEGATIVE

## 2022-01-21 ENCOUNTER — INFUSION THERAPY VISIT (OUTPATIENT)
Dept: INFUSION THERAPY | Facility: CLINIC | Age: 27
End: 2022-01-21
Attending: INTERNAL MEDICINE
Payer: COMMERCIAL

## 2022-01-21 VITALS
WEIGHT: 216.6 LBS | BODY MASS INDEX: 32.08 KG/M2 | SYSTOLIC BLOOD PRESSURE: 122 MMHG | OXYGEN SATURATION: 100 % | TEMPERATURE: 98.8 F | HEART RATE: 84 BPM | DIASTOLIC BLOOD PRESSURE: 79 MMHG | RESPIRATION RATE: 16 BRPM | HEIGHT: 69 IN

## 2022-01-21 DIAGNOSIS — M31.19 ACQUIRED TTP (H): Primary | ICD-10-CM

## 2022-01-21 LAB
BASOPHILS # BLD AUTO: 0.1 10E3/UL (ref 0–0.2)
BASOPHILS NFR BLD AUTO: 1 %
EOSINOPHIL # BLD AUTO: 0.2 10E3/UL (ref 0–0.7)
EOSINOPHIL NFR BLD AUTO: 2 %
ERYTHROCYTE [DISTWIDTH] IN BLOOD BY AUTOMATED COUNT: 12.4 % (ref 10–15)
HBV SURFACE AG SERPL QL IA: NONREACTIVE
HCT VFR BLD AUTO: 41.8 % (ref 35–47)
HGB BLD-MCNC: 14.1 G/DL (ref 11.7–15.7)
IMM GRANULOCYTES # BLD: 0 10E3/UL
IMM GRANULOCYTES NFR BLD: 0 %
LYMPHOCYTES # BLD AUTO: 0.5 10E3/UL (ref 0.8–5.3)
LYMPHOCYTES NFR BLD AUTO: 8 %
MCH RBC QN AUTO: 28.1 PG (ref 26.5–33)
MCHC RBC AUTO-ENTMCNC: 33.7 G/DL (ref 31.5–36.5)
MCV RBC AUTO: 83 FL (ref 78–100)
MONOCYTES # BLD AUTO: 0.6 10E3/UL (ref 0–1.3)
MONOCYTES NFR BLD AUTO: 9 %
NEUTROPHILS # BLD AUTO: 4.9 10E3/UL (ref 1.6–8.3)
NEUTROPHILS NFR BLD AUTO: 80 %
NRBC # BLD AUTO: 0 10E3/UL
NRBC BLD AUTO-RTO: 0 /100
PLATELET # BLD AUTO: 331 10E3/UL (ref 150–450)
RBC # BLD AUTO: 5.02 10E6/UL (ref 3.8–5.2)
WBC # BLD AUTO: 6.2 10E3/UL (ref 4–11)

## 2022-01-21 PROCEDURE — 250N000011 HC RX IP 250 OP 636: Performed by: INTERNAL MEDICINE

## 2022-01-21 PROCEDURE — 258N000003 HC RX IP 258 OP 636: Performed by: INTERNAL MEDICINE

## 2022-01-21 PROCEDURE — 85397 CLOTTING FUNCT ACTIVITY: CPT | Performed by: INTERNAL MEDICINE

## 2022-01-21 PROCEDURE — 96365 THER/PROPH/DIAG IV INF INIT: CPT

## 2022-01-21 PROCEDURE — 96366 THER/PROPH/DIAG IV INF ADDON: CPT

## 2022-01-21 PROCEDURE — 96375 TX/PRO/DX INJ NEW DRUG ADDON: CPT

## 2022-01-21 PROCEDURE — 85025 COMPLETE CBC W/AUTO DIFF WBC: CPT | Performed by: INTERNAL MEDICINE

## 2022-01-21 PROCEDURE — 87340 HEPATITIS B SURFACE AG IA: CPT | Performed by: INTERNAL MEDICINE

## 2022-01-21 PROCEDURE — 250N000013 HC RX MED GY IP 250 OP 250 PS 637: Performed by: INTERNAL MEDICINE

## 2022-01-21 PROCEDURE — 36415 COLL VENOUS BLD VENIPUNCTURE: CPT | Performed by: INTERNAL MEDICINE

## 2022-01-21 RX ORDER — HEPARIN SODIUM,PORCINE 10 UNIT/ML
5 VIAL (ML) INTRAVENOUS
Status: CANCELLED | OUTPATIENT
Start: 2022-01-28

## 2022-01-21 RX ORDER — DIPHENHYDRAMINE HCL 25 MG
50 CAPSULE ORAL ONCE
Status: CANCELLED
Start: 2022-01-28

## 2022-01-21 RX ORDER — ACETAMINOPHEN 325 MG/1
650 TABLET ORAL ONCE
Status: CANCELLED
Start: 2022-01-28

## 2022-01-21 RX ORDER — ALBUTEROL SULFATE 0.83 MG/ML
2.5 SOLUTION RESPIRATORY (INHALATION)
Status: CANCELLED | OUTPATIENT
Start: 2022-01-28

## 2022-01-21 RX ORDER — DIPHENHYDRAMINE HYDROCHLORIDE 50 MG/ML
50 INJECTION INTRAMUSCULAR; INTRAVENOUS
Status: COMPLETED | OUTPATIENT
Start: 2022-01-21 | End: 2022-01-21

## 2022-01-21 RX ORDER — NALOXONE HYDROCHLORIDE 0.4 MG/ML
0.2 INJECTION, SOLUTION INTRAMUSCULAR; INTRAVENOUS; SUBCUTANEOUS
Status: CANCELLED | OUTPATIENT
Start: 2022-01-28

## 2022-01-21 RX ORDER — DIPHENHYDRAMINE HCL 25 MG
50 CAPSULE ORAL ONCE
Status: COMPLETED | OUTPATIENT
Start: 2022-01-21 | End: 2022-01-21

## 2022-01-21 RX ORDER — HEPARIN SODIUM (PORCINE) LOCK FLUSH IV SOLN 100 UNIT/ML 100 UNIT/ML
5 SOLUTION INTRAVENOUS
Status: DISCONTINUED | OUTPATIENT
Start: 2022-01-21 | End: 2022-01-21 | Stop reason: HOSPADM

## 2022-01-21 RX ORDER — MEPERIDINE HYDROCHLORIDE 25 MG/ML
25 INJECTION INTRAMUSCULAR; INTRAVENOUS; SUBCUTANEOUS EVERY 30 MIN PRN
Status: CANCELLED | OUTPATIENT
Start: 2022-01-28

## 2022-01-21 RX ORDER — DIPHENHYDRAMINE HYDROCHLORIDE 50 MG/ML
50 INJECTION INTRAMUSCULAR; INTRAVENOUS
Status: CANCELLED
Start: 2022-01-28

## 2022-01-21 RX ORDER — HEPARIN SODIUM (PORCINE) LOCK FLUSH IV SOLN 100 UNIT/ML 100 UNIT/ML
5 SOLUTION INTRAVENOUS
Status: CANCELLED | OUTPATIENT
Start: 2022-01-28

## 2022-01-21 RX ORDER — ACETAMINOPHEN 325 MG/1
650 TABLET ORAL ONCE
Status: COMPLETED | OUTPATIENT
Start: 2022-01-21 | End: 2022-01-21

## 2022-01-21 RX ORDER — ALBUTEROL SULFATE 90 UG/1
1-2 AEROSOL, METERED RESPIRATORY (INHALATION)
Status: CANCELLED
Start: 2022-01-28

## 2022-01-21 RX ORDER — METHYLPREDNISOLONE SODIUM SUCCINATE 125 MG/2ML
125 INJECTION, POWDER, LYOPHILIZED, FOR SOLUTION INTRAMUSCULAR; INTRAVENOUS
Status: CANCELLED
Start: 2022-01-28

## 2022-01-21 RX ORDER — EPINEPHRINE 1 MG/ML
0.3 INJECTION, SOLUTION INTRAMUSCULAR; SUBCUTANEOUS EVERY 5 MIN PRN
Status: CANCELLED | OUTPATIENT
Start: 2022-01-28

## 2022-01-21 RX ORDER — HEPARIN SODIUM,PORCINE 10 UNIT/ML
5 VIAL (ML) INTRAVENOUS
Status: DISCONTINUED | OUTPATIENT
Start: 2022-01-21 | End: 2022-01-21 | Stop reason: HOSPADM

## 2022-01-21 RX ADMIN — ACETAMINOPHEN 650 MG: 325 TABLET ORAL at 13:05

## 2022-01-21 RX ADMIN — RITUXIMAB-ABBS 800 MG: 10 INJECTION, SOLUTION INTRAVENOUS at 13:28

## 2022-01-21 RX ADMIN — DIPHENHYDRAMINE HYDROCHLORIDE 50 MG: 50 INJECTION INTRAMUSCULAR; INTRAVENOUS at 14:01

## 2022-01-21 RX ADMIN — SODIUM CHLORIDE 250 ML: 9 INJECTION, SOLUTION INTRAVENOUS at 14:02

## 2022-01-21 RX ADMIN — DIPHENHYDRAMINE HYDROCHLORIDE 50 MG: 25 CAPSULE ORAL at 13:05

## 2022-01-21 ASSESSMENT — MIFFLIN-ST. JEOR: SCORE: 1791.87

## 2022-01-21 NOTE — PATIENT INSTRUCTIONS
Dear Zahida Grove    Thank you for choosing [facility] ([facility]) for your infusion.  The following information is a summary of our appointment as well as important reminders.      We look forward in seeing you on your next appointment here at [facility] ([facility]).  Please don t hesitate to call us at [phone] to reschedule any of your appointments or to speak with one of the [facility] registered nurses.  It was a pleasure taking care of you today.    Sincerely,    [facility]  [address] [place] [address]  [address]  Phone:  [phone]    Patient Education     Rituximab Solution for injection  What is this medicine?  RITUXIMAB (ri TUX i mab) is a monoclonal antibody. This medicine changes the way the body's immune system works. It is used commonly to treat non-Hodgkin lymphoma and other conditions. In cancer cells, this drug targets a specific protein within cancer cells and stops the cancer cells from growing. It is also used to treat rheumatoid arthritis (RA). In RA, this medicine slows the inflammatory process and help reduce joint pain and swelling. This medicine is often used with other cancer or arthritis medications.  This medicine may be used for other purposes; ask your health care provider or pharmacist if you have questions.  What should I tell my health care provider before I take this medicine?  They need to know if you have any of these conditions:    blood disorders    heart disease    history of hepatitis B    infection (especially a virus infection such as chickenpox, cold sores, or herpes)    irregular heartbeat    kidney disease    lung or breathing disease, like asthma    lupus    an unusual or allergic reaction to rituximab, mouse proteins, other medicines, foods, dyes, or preservatives    pregnant or trying to get  pregnant    breast-feeding  How should I use this medicine?  This medicine is for infusion into a vein. It is administered in a hospital or clinic by a specially trained health care professional.  A special MedGuide will be given to you by the pharmacist with each prescription and refill. Be sure to read this information carefully each time.  Talk to your pediatrician regarding the use of this medicine in children. This medicine is not approved for use in children.  Overdosage: If you think you have taken too much of this medicine contact a poison control center or emergency room at once.  NOTE: This medicine is only for you. Do not share this medicine with others.  What if I miss a dose?  It is important not to miss a dose. Call your doctor or health care professional if you are unable to keep an appointment.  What may interact with this medicine?    cisplatin    medicines for blood pressure    some other medicines for arthritis    vaccines  This list may not describe all possible interactions. Give your health care provider a list of all the medicines, herbs, non-prescription drugs, or dietary supplements you use. Also tell them if you smoke, drink alcohol, or use illegal drugs. Some items may interact with your medicine.  What should I watch for while using this medicine?  Report any side effects that you notice during your treatment right away, such as changes in your breathing, fever, chills, dizziness or lightheadedness. These effects are more common with the first dose.  Visit your prescriber or health care professional for checks on your progress. You will need to have regular blood work. Report any other side effects. The side effects of this medicine can continue after you finish your treatment. Continue your course of treatment even though you feel ill unless your doctor tells you to stop.  Call your doctor or health care professional for advice if you get a fever, chills or sore throat, or other symptoms  of a cold or flu. Do not treat yourself. This drug decreases your body's ability to fight infections. Try to avoid being around people who are sick.  This medicine may increase your risk to bruise or bleed. Call your doctor or health care professional if you notice any unusual bleeding.  Be careful brushing and flossing your teeth or using a toothpick because you may get an infection or bleed more easily. If you have any dental work done, tell your dentist you are receiving this medicine.  Avoid taking products that contain aspirin, acetaminophen, ibuprofen, naproxen, or ketoprofen unless instructed by your doctor. These medicines may hide a fever.  Do not become pregnant while taking this medicine. Women should inform their doctor if they wish to become pregnant or think they might be pregnant. There is a potential for serious side effects to an unborn child. Talk to your health care professional or pharmacist for more information. Do not breast-feed an infant while taking this medicine.  What side effects may I notice from receiving this medicine?  Side effects that you should report to your doctor or health care professional as soon as possible:    allergic reactions like skin rash, itching or hives, swelling of the face, lips, or tongue    low blood counts - this medicine may decrease the number of white blood cells, red blood cells and platelets. You may be at increased risk for infections and bleeding.    signs of infection - fever or chills, cough, sore throat, pain or difficulty passing urine    signs of decreased platelets or bleeding - bruising, pinpoint red spots on the skin, black, tarry stools, blood in the urine    signs of decreased red blood cells - unusually weak or tired, fainting spells, lightheadedness    breathing problems    confused, not responsive    chest pain    fast, irregular heartbeat    feeling faint or lightheaded, falls    mouth sores    redness, blistering, peeling or loosening of  the skin, including inside the mouth    stomach pain    swelling of the ankles, feet, or hands    trouble passing urine or change in the amount of urine  Side effects that usually do not require medical attention (report to your doctor or other health care professional if they continue or are bothersome):    anxiety    headache    loss of appetite    muscle aches    nausea    night sweats  This list may not describe all possible side effects. Call your doctor for medical advice about side effects. You may report side effects to FDA at 7-438-GEM-0318.  Where should I keep my medicine?  This drug is given in a hospital or clinic and will not be stored at home.  NOTE:This sheet is a summary. It may not cover all possible information. If you have questions about this medicine, talk to your doctor, pharmacist, or health care provider. Copyright  2016 Gold Standard

## 2022-01-21 NOTE — LETTER
1/21/2022         RE: Zahida Grove  [address]  [place] [facility] [address]        Dear Colleague,    Thank you for referring your patient, Zahida Grove, to the [facility]. Please see a copy of my visit note below.    Nursing Note  Zahida Grove [facility] for:   Chief Complaint   Patient presents with     Infusion     rituximab     During [facility]'s [facility] appointment, orders from Dr Modi were completed.  Frequency: weekly for 4 weeks      Progress note:  Patient identification verified by name and date of birth.  Assessment completed.  Vitals recorded in Doc Flowsheets.  Patient was provided with education regarding medication/procedure and possible side effects.  Patient verbalized understanding.     present during visit today: Not Applicable.    Treatment Conditions: ~~~ NOTE: If the patient answers yes to any of the questions below, hold the infusion and contact ordering provider or on-call provider.    1. Have you recently had an elevated temperature, fever, chills, productive cough, coughing for 3 weeks or longer or hemoptysis, abnormal vital signs, night sweats, chest pain or have you noticed a decrease in your appetite, unexplained weight loss or fatigue? No  2. Do you have any open wounds or new incisions? No  3. Do you have any recent or upcoming hospitalizations, surgeries or dental procedures? No  4. Do you currently have or recently have had any signs of illness or infection or are you on any antibiotics? No  5. Have you had any new, sudden or worsening abdominal pain? No  6. Have you or anyone in your household received a live vaccination in the past 4 weeks? Please note:  No live vaccines while on biologic/chemotherapy until 6 months after the last treatment.  Patient can receive the flu vaccine (shot only) and the pneumovax.  It is optimal for the  patient to get these vaccines mid cycle, but they can be given at any time as long as it is not on the day of the infusion. No  7. Have you recently been diagnosed with any new nervous system diseases (ie. Multiple sclerosis, Guillain [place], seizures, neurological changes) or cancer diagnosis? No  8. Are you on any form of radiation or chemotherapy? No  9. Are you pregnant or breast feeding or do you have plans of pregnancy in the future? No  10. Have you been having any signs of worsening depression or suicidal ideations?  (benlysta only) Not applicable  11. Have there been any other new onset medical symptoms? No      Premedications: administered per order.    Drug Waste Record: No    Infusion length and rate:  infusion given over approximately 90 minutes    Labs: were drawn per orders.     Vascular access: peripheral IV placed today.    Is the next appt scheduled? yes  Asymptomatic COVID test completed? no    Post Infusion Assessment:  Patient tolerated infusion poorly due to : Hypersensitivity: Did patient have a hypersensitivity reaction? : Yes. Able to restart medication at 1/2 rate after IV benadryl. Patient tolerated restarting with rates of 100 ml/hr for 30 min, increasing by 100 ml every 30 min to final rate of 400 ml/hr for duration of infusion.   Site patent and intact, free from redness, edema or discomfort.  Access discontinued per protocol.     Discharge Plan:   Follow up plan of care with: ongoing infusions at [facility]., ordering provider as scheduled. and after visit summary declined by patient  Discharge instructions were reviewed with patient.  Patient/representative verbalized understanding of discharge instructions and all questions answered.  Patient discharged from [facility] in stable condition.    Winsome Delgado RN       Administrations This Visit     0.9% sodium chloride BOLUS     Admin Date  01/21/2022 Action  New  "Bag Dose  250 mL Route  Intravenous Administered By  Winsome Delgado RN          acetaminophen (TYLENOL) tablet 650 mg     Admin Date  01/21/2022 Action  Given Dose  650 mg Route  Oral Administered By  Winsmoe Delgado RN          diphenhydrAMINE (BENADRYL) capsule 50 mg     Admin Date  01/21/2022 Action  Given Dose  50 mg Route  Oral Administered By  Winsome Delgado RN          diphenhydrAMINE (BENADRYL) injection 50 mg     Admin Date  01/21/2022 Action  Given Dose  50 mg Route  Intravenous Administered By  Winsome Delgado RN          riTUXimab-abbs (TRUXIMA) 800 mg in sodium chloride 0.9 % 500 mL NON-oncology infusion     Admin Date  01/21/2022 Action  New Bag Dose  800 mg Route  Intravenous Administered By  Fanta Garcia RN           Admin Date  01/21/2022 Action  Restarted Dose   Route  Intravenous Administered By  Winsome Delgado RN              /82   Pulse 62   Temp 98.8  F (37.1  C) (Oral)   Resp 16   Ht 1.753 m (5' 9\")   Wt 98.2 kg (216 lb 9.6 oz)   SpO2 98%   BMI 31.99 kg/m          Again, thank you for allowing me to participate in the care of your patient.      Sincerely,    [facility]    "

## 2022-01-21 NOTE — PROGRESS NOTES
Nursing Note  Zahida DODSON Ilirpepper presents [facility] for:   Chief Complaint   Patient presents with     Infusion     rituximab     During [facility]'s [facility] appointment, orders from Dr Modi were completed.  Frequency: weekly for 4 weeks      Progress note:  Patient identification verified by name and date of birth.  Assessment completed.  Vitals recorded in Doc Flowsheets.  Patient was provided with education regarding medication/procedure and possible side effects.  Patient verbalized understanding.     present during visit today: Not Applicable.    Treatment Conditions: ~~~ NOTE: If the patient answers yes to any of the questions below, hold the infusion and contact ordering provider or on-call provider.    1. Have you recently had an elevated temperature, fever, chills, productive cough, coughing for 3 weeks or longer or hemoptysis, abnormal vital signs, night sweats, chest pain or have you noticed a decrease in your appetite, unexplained weight loss or fatigue? No  2. Do you have any open wounds or new incisions? No  3. Do you have any recent or upcoming hospitalizations, surgeries or dental procedures? No  4. Do you currently have or recently have had any signs of illness or infection or are you on any antibiotics? No  5. Have you had any new, sudden or worsening abdominal pain? No  6. Have you or anyone in your household received a live vaccination in the past 4 weeks? Please note:  No live vaccines while on biologic/chemotherapy until 6 months after the last treatment.  Patient can receive the flu vaccine (shot only) and the pneumovax.  It is optimal for the patient to get these vaccines mid cycle, but they can be given at any time as long as it is not on the day of the infusion. No  7. Have you recently been diagnosed with any new nervous system diseases (ie. Multiple sclerosis, Guillain [place], seizures, neurological changes) or  cancer diagnosis? No  8. Are you on any form of radiation or chemotherapy? No  9. Are you pregnant or breast feeding or do you have plans of pregnancy in the future? No  10. Have you been having any signs of worsening depression or suicidal ideations?  (benlysta only) Not applicable  11. Have there been any other new onset medical symptoms? No      Premedications: administered per order.    Drug Waste Record: No    Infusion length and rate:  infusion given over approximately 90 minutes    Labs: were drawn per orders.     Vascular access: peripheral IV placed today.    Is the next appt scheduled? yes  Asymptomatic COVID test completed? no    Post Infusion Assessment:  Patient tolerated infusion poorly due to : Hypersensitivity: Did patient have a hypersensitivity reaction? : Yes  Drug or Product name: rituximab  Were pre-meds administered?: Yes  What pre-meds were administered?: Acetaminophen (Tylenol);Diphenhydramine (Benadryl)  First or Subsequent treatment: Subsequent infusion  Rate of infusion when patient had hypersensitivity reaction: 200 ml/hr  Time the hypersensitivity reaction was first recognized: 1400  Symptoms observed or reported (select all that apply): Itching;Other: (Comment) (scratchy throat)  Interventions/treatment following reaction: Infusion stopped;Hypersensitivity medications administered  What hypersensitivity medications were administered?: DiphendydrAMINE (benadryl)  Name of provider notified: Dr Modi  Time provider notified: 9850  Type of notification (select all that apply): Paged/Phone  Was the patient re-challenged today?: Yes - tolerated well with rates listed below.    Able to restart medication at 1/2 rate after IV benadryl. Patient tolerated restarting with rates of 100 ml/hr for 30 min, increasing by 100 ml every 30 min to final rate of 400 ml/hr for duration of infusion.   Site patent and intact, free from redness, edema or discomfort.  Access discontinued per protocol.  "    Discharge Plan:   Follow up plan of care with: ongoing infusions at [facility]., ordering provider as scheduled. and after visit summary declined by patient  Discharge instructions were reviewed with patient.  Patient/representative verbalized understanding of discharge instructions and all questions answered.  Patient discharged from [facility] in stable condition.    Winsome Delgado RN       Administrations This Visit     0.9% sodium chloride BOLUS     Admin Date  01/21/2022 Action  New Bag Dose  250 mL Route  Intravenous Administered By  Winsome Delgado RN          acetaminophen (TYLENOL) tablet 650 mg     Admin Date  01/21/2022 Action  Given Dose  650 mg Route  Oral Administered By  Winsome Delgado RN          diphenhydrAMINE (BENADRYL) capsule 50 mg     Admin Date  01/21/2022 Action  Given Dose  50 mg Route  Oral Administered By  Winsome Delgado RN          diphenhydrAMINE (BENADRYL) injection 50 mg     Admin Date  01/21/2022 Action  Given Dose  50 mg Route  Intravenous Administered By  Winsome Delgado RN          riTUXimab-abbs (TRUXIMA) 800 mg in sodium chloride 0.9 % 500 mL NON-oncology infusion     Admin Date  01/21/2022 Action  New Bag Dose  800 mg Route  Intravenous Administered By  Fanta Garcia RN           Admin Date  01/21/2022 Action  Restarted Dose   Route  Intravenous Administered By  Winsome Delgado RN                  /82   Pulse 62   Temp 98.8  F (37.1  C) (Oral)   Resp 16   Ht 1.753 m (5' 9\")   Wt 98.2 kg (216 lb 9.6 oz)   SpO2 98%   BMI 31.99 kg/m      "

## 2022-01-27 ENCOUNTER — TELEPHONE (OUTPATIENT)
Dept: ONCOLOGY | Facility: CLINIC | Age: 27
End: 2022-01-27
Payer: COMMERCIAL

## 2022-01-27 RX ORDER — DIPHENHYDRAMINE HYDROCHLORIDE 50 MG/ML
50 INJECTION INTRAMUSCULAR; INTRAVENOUS EVERY 6 HOURS PRN
Status: CANCELLED
Start: 2022-01-28

## 2022-01-27 NOTE — TELEPHONE ENCOUNTER
----- Message from Yessica Carty RN sent at 1/27/2022  3:05 PM CST -----  Regarding: RE: Patient coming tomorrow for rituxan 1/28/22  Dr. Rian Nelson said he would change the order to IV Benadryl.  Neena  ----- Message -----  From: Yessica Lloyd RN  Sent: 1/27/2022  12:57 PM CST  To: Damián Modi MD, Yessica Carty, RN  Subject: Patient coming tomorrow for rituxan 1/28/22      Good afternoon,    Just checking in to see if we can get the orders changed for this person for her second rituxan infusion tomorrow. She reacted the first time with oral benadry, wondering if we can get this changed to IV benadryl?    Please advise, thank you,  Yessica Lloyd RN  [facility]  Phone [phone]

## 2022-01-28 ENCOUNTER — INFUSION THERAPY VISIT (OUTPATIENT)
Dept: INFUSION THERAPY | Facility: CLINIC | Age: 27
End: 2022-01-28
Attending: INTERNAL MEDICINE
Payer: COMMERCIAL

## 2022-01-28 VITALS
BODY MASS INDEX: 31.99 KG/M2 | HEART RATE: 83 BPM | OXYGEN SATURATION: 97 % | RESPIRATION RATE: 16 BRPM | WEIGHT: 216.6 LBS | DIASTOLIC BLOOD PRESSURE: 72 MMHG | SYSTOLIC BLOOD PRESSURE: 124 MMHG | TEMPERATURE: 97.8 F

## 2022-01-28 DIAGNOSIS — M31.19 ACQUIRED TTP (H): Primary | ICD-10-CM

## 2022-01-28 LAB
BASOPHILS # BLD AUTO: 0 10E3/UL (ref 0–0.2)
BASOPHILS NFR BLD AUTO: 1 %
EOSINOPHIL # BLD AUTO: 0.1 10E3/UL (ref 0–0.7)
EOSINOPHIL NFR BLD AUTO: 2 %
ERYTHROCYTE [DISTWIDTH] IN BLOOD BY AUTOMATED COUNT: 12.3 % (ref 10–15)
HCT VFR BLD AUTO: 41.6 % (ref 35–47)
HGB BLD-MCNC: 14.2 G/DL (ref 11.7–15.7)
IMM GRANULOCYTES # BLD: 0 10E3/UL
IMM GRANULOCYTES NFR BLD: 0 %
LYMPHOCYTES # BLD AUTO: 0.4 10E3/UL (ref 0.8–5.3)
LYMPHOCYTES NFR BLD AUTO: 9 %
MCH RBC QN AUTO: 28.1 PG (ref 26.5–33)
MCHC RBC AUTO-ENTMCNC: 34.1 G/DL (ref 31.5–36.5)
MCV RBC AUTO: 82 FL (ref 78–100)
MONOCYTES # BLD AUTO: 0.5 10E3/UL (ref 0–1.3)
MONOCYTES NFR BLD AUTO: 10 %
NEUTROPHILS # BLD AUTO: 3.7 10E3/UL (ref 1.6–8.3)
NEUTROPHILS NFR BLD AUTO: 78 %
NRBC # BLD AUTO: 0 10E3/UL
NRBC BLD AUTO-RTO: 0 /100
PLATELET # BLD AUTO: 295 10E3/UL (ref 150–450)
RBC # BLD AUTO: 5.06 10E6/UL (ref 3.8–5.2)
WBC # BLD AUTO: 4.7 10E3/UL (ref 4–11)

## 2022-01-28 PROCEDURE — 85397 CLOTTING FUNCT ACTIVITY: CPT | Performed by: INTERNAL MEDICINE

## 2022-01-28 PROCEDURE — 96415 CHEMO IV INFUSION ADDL HR: CPT

## 2022-01-28 PROCEDURE — 96375 TX/PRO/DX INJ NEW DRUG ADDON: CPT | Performed by: INTERNAL MEDICINE

## 2022-01-28 PROCEDURE — 96415 CHEMO IV INFUSION ADDL HR: CPT | Performed by: INTERNAL MEDICINE

## 2022-01-28 PROCEDURE — 258N000003 HC RX IP 258 OP 636: Performed by: INTERNAL MEDICINE

## 2022-01-28 PROCEDURE — 96413 CHEMO IV INFUSION 1 HR: CPT

## 2022-01-28 PROCEDURE — 96413 CHEMO IV INFUSION 1 HR: CPT | Performed by: INTERNAL MEDICINE

## 2022-01-28 PROCEDURE — 96375 TX/PRO/DX INJ NEW DRUG ADDON: CPT

## 2022-01-28 PROCEDURE — 36415 COLL VENOUS BLD VENIPUNCTURE: CPT | Performed by: INTERNAL MEDICINE

## 2022-01-28 PROCEDURE — 250N000011 HC RX IP 250 OP 636: Performed by: INTERNAL MEDICINE

## 2022-01-28 PROCEDURE — 250N000013 HC RX MED GY IP 250 OP 250 PS 637: Performed by: INTERNAL MEDICINE

## 2022-01-28 PROCEDURE — 85025 COMPLETE CBC W/AUTO DIFF WBC: CPT | Performed by: INTERNAL MEDICINE

## 2022-01-28 RX ORDER — EPINEPHRINE 1 MG/ML
0.3 INJECTION, SOLUTION INTRAMUSCULAR; SUBCUTANEOUS EVERY 5 MIN PRN
Status: CANCELLED | OUTPATIENT
Start: 2022-02-04

## 2022-01-28 RX ORDER — NALOXONE HYDROCHLORIDE 0.4 MG/ML
0.2 INJECTION, SOLUTION INTRAMUSCULAR; INTRAVENOUS; SUBCUTANEOUS
Status: CANCELLED | OUTPATIENT
Start: 2022-02-04

## 2022-01-28 RX ORDER — HEPARIN SODIUM,PORCINE 10 UNIT/ML
5 VIAL (ML) INTRAVENOUS
Status: CANCELLED | OUTPATIENT
Start: 2022-02-04

## 2022-01-28 RX ORDER — ACETAMINOPHEN 325 MG/1
650 TABLET ORAL ONCE
Status: COMPLETED | OUTPATIENT
Start: 2022-01-28 | End: 2022-01-28

## 2022-01-28 RX ORDER — ALBUTEROL SULFATE 90 UG/1
1-2 AEROSOL, METERED RESPIRATORY (INHALATION)
Status: CANCELLED
Start: 2022-02-04

## 2022-01-28 RX ORDER — DIPHENHYDRAMINE HYDROCHLORIDE 50 MG/ML
50 INJECTION INTRAMUSCULAR; INTRAVENOUS EVERY 6 HOURS PRN
Status: DISCONTINUED | OUTPATIENT
Start: 2022-01-28 | End: 2022-01-28

## 2022-01-28 RX ORDER — MEPERIDINE HYDROCHLORIDE 25 MG/ML
25 INJECTION INTRAMUSCULAR; INTRAVENOUS; SUBCUTANEOUS EVERY 30 MIN PRN
Status: CANCELLED | OUTPATIENT
Start: 2022-02-04

## 2022-01-28 RX ORDER — DIPHENHYDRAMINE HYDROCHLORIDE 50 MG/ML
50 INJECTION INTRAMUSCULAR; INTRAVENOUS EVERY 6 HOURS PRN
Status: CANCELLED
Start: 2022-02-04

## 2022-01-28 RX ORDER — HEPARIN SODIUM (PORCINE) LOCK FLUSH IV SOLN 100 UNIT/ML 100 UNIT/ML
5 SOLUTION INTRAVENOUS
Status: CANCELLED | OUTPATIENT
Start: 2022-02-04

## 2022-01-28 RX ORDER — ALBUTEROL SULFATE 0.83 MG/ML
2.5 SOLUTION RESPIRATORY (INHALATION)
Status: CANCELLED | OUTPATIENT
Start: 2022-02-04

## 2022-01-28 RX ORDER — DIPHENHYDRAMINE HYDROCHLORIDE 50 MG/ML
50 INJECTION INTRAMUSCULAR; INTRAVENOUS
Status: CANCELLED
Start: 2022-02-04

## 2022-01-28 RX ORDER — METHYLPREDNISOLONE SODIUM SUCCINATE 125 MG/2ML
125 INJECTION, POWDER, LYOPHILIZED, FOR SOLUTION INTRAMUSCULAR; INTRAVENOUS
Status: CANCELLED
Start: 2022-02-04

## 2022-01-28 RX ORDER — ACETAMINOPHEN 325 MG/1
650 TABLET ORAL ONCE
Status: CANCELLED
Start: 2022-02-04

## 2022-01-28 RX ADMIN — DIPHENHYDRAMINE HYDROCHLORIDE 50 MG: 50 INJECTION, SOLUTION INTRAMUSCULAR; INTRAVENOUS at 14:25

## 2022-01-28 RX ADMIN — RITUXIMAB-ABBS 800 MG: 10 INJECTION, SOLUTION INTRAVENOUS at 15:17

## 2022-01-28 RX ADMIN — ACETAMINOPHEN 650 MG: 325 TABLET ORAL at 14:27

## 2022-01-28 NOTE — LETTER
1/28/2022         RE: Zahida Grove  [address]  [facility] [address]        Dear Colleague,    Thank you for referring your patient, Zahida Grove, to the [facility]. Please see a copy of my visit note below.    Nursing Note  Zahida Grove [facility]:   Chief Complaint   Patient presents with     Infusion     rituximab     During [facility]'s [facility] appointment, orders from Dr Modi were completed.  Frequency: weekly for 4 weeks    Progress note:  Patient identification verified by name and date of birth.  Assessment completed.  Vitals recorded in Doc Flowsheets.  Patient was provided with education regarding medication/procedure and possible side effects.  Patient verbalized understanding.     present during visit today: Not Applicable.    Treatment Conditions: ~~~ NOTE: If the patient answers yes to any of the questions below, hold the infusion and contact ordering provider or on-call provider.    1. Have you recently had an elevated temperature, fever, chills, productive cough, coughing for 3 weeks or longer or hemoptysis, abnormal vital signs, night sweats, chest pain or have you noticed a decrease in your appetite, unexplained weight loss or fatigue? No  2. Do you have any open wounds or new incisions? No  3. Do you have any recent or upcoming hospitalizations, surgeries or dental procedures? No  4. Do you currently have or recently have had any signs of illness or infection or are you on any antibiotics? No  5. Have you had any new, sudden or worsening abdominal pain? No  6. Have you or anyone in your household received a live vaccination in the past 4 weeks? Please note:  No live vaccines while on biologic/chemotherapy until 6 months after the last treatment.  Patient can receive the flu vaccine (shot only) and the pneumovax.  It is optimal for the  patient to get these vaccines mid cycle, but they can be given at any time as long as it is not on the day of the infusion. No  7. Have you recently been diagnosed with any new nervous system diseases (ie. Multiple sclerosis, Guillain [place], seizures, neurological changes) or cancer diagnosis? No  8. Are you on any form of radiation or chemotherapy? No  9. Are you pregnant or breast feeding or do you have plans of pregnancy in the future? No  10. Have you been having any signs of worsening depression or suicidal ideations?  (benlysta only) Not applicable  11. Have there been any other new onset medical symptoms? No      Premedications: tylenol and benadryl IV administered per order.     Drug Waste Record: No    Infusion length and rate:  infusion given over approximately 2 hours and 25 min  infusion starts at 100 ml/hr, then increased by 100 ml/hr every 30 minutes to final rate of 400 ml/hr.    Labs: were drawn per orders.     Vascular access: peripheral IV placed today.    Is the next appt scheduled? yes  Asymptomatic COVID test completed? no    Post Infusion Assessment:  Report given to GERA Ho at 1630. Care was transferred at this time.     Biologic Infusion Post Education: Call the triage nurse at your clinic or seek medical attention if you have chills and/or temperature greater than or equal to 100.5, uncontrolled nausea/vomiting, diarrhea, constipation, dizziness, shortness of breath, chest pain, heart palpitations, weakness or any other new or concerning symptoms, questions or concerns.  You cannot have any live virus vaccines prior to or during treatment or up to 6 months post infusion.  If you have an upcoming surgery, medical procedure or dental procedure during treatment, this should be discussed with your ordering physician and your surgeon/dentist.  If you are having any concerning symptom, if you are unsure if you should get your next infusion or wish to speak to a provider before your next  infusion, please call your care coordinator or triage nurse at your clinic to notify them so we can adequately serve you.     Discharge Plan:   Follow up plan of care with: ongoing infusions at [facility]. and ordering provider as scheduled.  Discharge instructions were reviewed with patient.  Patient/representative verbalized understanding of discharge instructions and all questions answered.  Patient discharged from [facility] in stable condition.    Winsome Delgado RN       Administrations This Visit     acetaminophen (TYLENOL) tablet 650 mg     Admin Date  01/28/2022 Action  Given Dose  650 mg Route  Oral Administered By  Winsome Delgado RN          diphenhydrAMINE (BENADRYL) 50 mg in sodium chloride 0.9 % 51 mL intermittent infusion     Admin Date  01/28/2022 Action  New Bag Dose  50 mg Rate  306 mL/hr Route  Intravenous Administered By  Winsome Delgado RN          riTUXimab-abbs (TRUXIMA) 800 mg in sodium chloride 0.9 % 800 mL NON-oncology infusion     Admin Date  01/28/2022 Action  New Bag Dose  800 mg Route  Intravenous Administered By  Winsome Delgado RN                  /87   Pulse 82   Temp 97.8  F (36.6  C) (Oral)   Resp 16   SpO2 97%             480 of 800 ml of rituximab given due to time constraints.           Again, thank you for allowing me to participate in the care of your patient.        Sincerely,        [facility]

## 2022-01-28 NOTE — PATIENT INSTRUCTIONS
Dear Zahida Grove    Thank you for choosing [facility] ([facility]) for your infusion.  The following information is a summary of our appointment as well as important reminders.      Last dose of Tylenol was  2:15 pm    EDUCATION POST BIOLOGICAL/CHEMOTHERAPY INFUSION  Call the triage nurse at your clinic or seek medical attention if you have chills and/or temperature greater than or equal to 100.5, uncontrolled nausea/vomiting, diarrhea, constipation, dizziness, shortness of breath, chest pain, heart palpitations, weakness or any other new or concerning symptoms, questions or concerns.  You can not have any live virus vaccines prior to or during treatment or up to 6 months post infusion.  If you have an upcoming surgery, medical procedure or dental procedure during treatment, this should be discussed with your ordering physician and your surgeon/dentist.  If you are having any concerning symptom, if you are unsure if you should get your next infusion or wish to speak to a provider before your next infusion, please call your care coordinator or triage nurse at your clinic to notify them so we can adequately serve you.    We look forward in seeing you on your next appointment here at [facility] ([facility]).  Please don t hesitate to call us at [phone] to reschedule any of your appointments or to speak with one of the [facility] registered nurses.  It was a pleasure taking care of you today.    Sincerely,    [facility]  [facility]  [address] [place] [address]  [id number]  Phone:  [phone]    Patient Education     Rituximab Solution for injection  What is this medicine?  RITUXIMAB (ri TUX i mab) is a monoclonal antibody. This medicine changes the way the body's immune system works. It is used commonly to treat non-Hodgkin lymphoma and other conditions. In cancer cells, this  drug targets a specific protein within cancer cells and stops the cancer cells from growing. It is also used to treat rheumatoid arthritis (RA). In RA, this medicine slows the inflammatory process and help reduce joint pain and swelling. This medicine is often used with other cancer or arthritis medications.  This medicine may be used for other purposes; ask your health care provider or pharmacist if you have questions.  What should I tell my health care provider before I take this medicine?  They need to know if you have any of these conditions:    blood disorders    heart disease    history of hepatitis B    infection (especially a virus infection such as chickenpox, cold sores, or herpes)    irregular heartbeat    kidney disease    lung or breathing disease, like asthma    lupus    an unusual or allergic reaction to rituximab, mouse proteins, other medicines, foods, dyes, or preservatives    pregnant or trying to get pregnant    breast-feeding  How should I use this medicine?  This medicine is for infusion into a vein. It is administered in a hospital or clinic by a specially trained health care professional.  A special MedGuide will be given to you by the pharmacist with each prescription and refill. Be sure to read this information carefully each time.  Talk to your pediatrician regarding the use of this medicine in children. This medicine is not approved for use in children.  Overdosage: If you think you have taken too much of this medicine contact a poison control center or emergency room at once.  NOTE: This medicine is only for you. Do not share this medicine with others.  What if I miss a dose?  It is important not to miss a dose. Call your doctor or health care professional if you are unable to keep an appointment.  What may interact with this medicine?    cisplatin    medicines for blood pressure    some other medicines for arthritis    vaccines  This list may not describe all possible interactions. Give  your health care provider a list of all the medicines, herbs, non-prescription drugs, or dietary supplements you use. Also tell them if you smoke, drink alcohol, or use illegal drugs. Some items may interact with your medicine.  What should I watch for while using this medicine?  Report any side effects that you notice during your treatment right away, such as changes in your breathing, fever, chills, dizziness or lightheadedness. These effects are more common with the first dose.  Visit your prescriber or health care professional for checks on your progress. You will need to have regular blood work. Report any other side effects. The side effects of this medicine can continue after you finish your treatment. Continue your course of treatment even though you feel ill unless your doctor tells you to stop.  Call your doctor or health care professional for advice if you get a fever, chills or sore throat, or other symptoms of a cold or flu. Do not treat yourself. This drug decreases your body's ability to fight infections. Try to avoid being around people who are sick.  This medicine may increase your risk to bruise or bleed. Call your doctor or health care professional if you notice any unusual bleeding.  Be careful brushing and flossing your teeth or using a toothpick because you may get an infection or bleed more easily. If you have any dental work done, tell your dentist you are receiving this medicine.  Avoid taking products that contain aspirin, acetaminophen, ibuprofen, naproxen, or ketoprofen unless instructed by your doctor. These medicines may hide a fever.  Do not become pregnant while taking this medicine. Women should inform their doctor if they wish to become pregnant or think they might be pregnant. There is a potential for serious side effects to an unborn child. Talk to your health care professional or pharmacist for more information. Do not breast-feed an infant while taking this medicine.  What side  effects may I notice from receiving this medicine?  Side effects that you should report to your doctor or health care professional as soon as possible:    allergic reactions like skin rash, itching or hives, swelling of the face, lips, or tongue    low blood counts - this medicine may decrease the number of white blood cells, red blood cells and platelets. You may be at increased risk for infections and bleeding.    signs of infection - fever or chills, cough, sore throat, pain or difficulty passing urine    signs of decreased platelets or bleeding - bruising, pinpoint red spots on the skin, black, tarry stools, blood in the urine    signs of decreased red blood cells - unusually weak or tired, fainting spells, lightheadedness    breathing problems    confused, not responsive    chest pain    fast, irregular heartbeat    feeling faint or lightheaded, falls    mouth sores    redness, blistering, peeling or loosening of the skin, including inside the mouth    stomach pain    swelling of the ankles, feet, or hands    trouble passing urine or change in the amount of urine  Side effects that usually do not require medical attention (report to your doctor or other health care professional if they continue or are bothersome):    anxiety    headache    loss of appetite    muscle aches    nausea    night sweats  This list may not describe all possible side effects. Call your doctor for medical advice about side effects. You may report side effects to FDA at 6-638-FDA-5750.  Where should I keep my medicine?  This drug is given in a hospital or clinic and will not be stored at home.  NOTE:This sheet is a summary. It may not cover all possible information. If you have questions about this medicine, talk to your doctor, pharmacist, or health care provider. Copyright  2016 Gold Standard

## 2022-01-28 NOTE — PROGRESS NOTES
Nursing Note  Zahida DODSON Ilirpepper presents [facility] for:   Chief Complaint   Patient presents with     Infusion     rituximab     During [facility]'s [facility] appointment, orders from Dr Modi were completed.  Frequency: weekly for 4 weeks    Progress note:  Patient identification verified by name and date of birth.  Assessment completed.  Vitals recorded in Doc Flowsheets.  Patient was provided with education regarding medication/procedure and possible side effects.  Patient verbalized understanding.     present during visit today: Not Applicable.    Treatment Conditions: ~~~ NOTE: If the patient answers yes to any of the questions below, hold the infusion and contact ordering provider or on-call provider.    1. Have you recently had an elevated temperature, fever, chills, productive cough, coughing for 3 weeks or longer or hemoptysis, abnormal vital signs, night sweats, chest pain or have you noticed a decrease in your appetite, unexplained weight loss or fatigue? No  2. Do you have any open wounds or new incisions? No  3. Do you have any recent or upcoming hospitalizations, surgeries or dental procedures? No  4. Do you currently have or recently have had any signs of illness or infection or are you on any antibiotics? No  5. Have you had any new, sudden or worsening abdominal pain? No  6. Have you or anyone in your household received a live vaccination in the past 4 weeks? Please note:  No live vaccines while on biologic/chemotherapy until 6 months after the last treatment.  Patient can receive the flu vaccine (shot only) and the pneumovax.  It is optimal for the patient to get these vaccines mid cycle, but they can be given at any time as long as it is not on the day of the infusion. No  7. Have you recently been diagnosed with any new nervous system diseases (ie. Multiple sclerosis, Guillain [place], seizures, neurological changes) or  cancer diagnosis? No  8. Are you on any form of radiation or chemotherapy? No  9. Are you pregnant or breast feeding or do you have plans of pregnancy in the future? No  10. Have you been having any signs of worsening depression or suicidal ideations?  (benlysta only) Not applicable  11. Have there been any other new onset medical symptoms? No      Premedications: tylenol and benadryl IV administered per order.     Drug Waste Record: No    Infusion length and rate:  infusion given over approximately 2 hours and 25 min  infusion starts at 100 ml/hr, then increased by 100 ml/hr every 30 minutes to final rate of 400 ml/hr.    Labs: were drawn per orders.     Vascular access: peripheral IV placed today.    Is the next appt scheduled? yes  Asymptomatic COVID test completed? no    Post Infusion Assessment:  Report given to GERA Ho at 1630. Care was transferred at this time.     Biologic Infusion Post Education: Call the triage nurse at your clinic or seek medical attention if you have chills and/or temperature greater than or equal to 100.5, uncontrolled nausea/vomiting, diarrhea, constipation, dizziness, shortness of breath, chest pain, heart palpitations, weakness or any other new or concerning symptoms, questions or concerns.  You cannot have any live virus vaccines prior to or during treatment or up to 6 months post infusion.  If you have an upcoming surgery, medical procedure or dental procedure during treatment, this should be discussed with your ordering physician and your surgeon/dentist.  If you are having any concerning symptom, if you are unsure if you should get your next infusion or wish to speak to a provider before your next infusion, please call your care coordinator or triage nurse at your clinic to notify them so we can adequately serve you.     Discharge Plan:   Follow up plan of care with: ongoing infusions at [facility]. and ordering provider as scheduled.  Discharge  instructions were reviewed with patient.  Patient/representative verbalized understanding of discharge instructions and all questions answered.  Patient discharged from [facility] in stable condition.    Winsome Delgado RN       Administrations This Visit     acetaminophen (TYLENOL) tablet 650 mg     Admin Date  01/28/2022 Action  Given Dose  650 mg Route  Oral Administered By  Winsome Delgado RN          diphenhydrAMINE (BENADRYL) 50 mg in sodium chloride 0.9 % 51 mL intermittent infusion     Admin Date  01/28/2022 Action  New Bag Dose  50 mg Rate  306 mL/hr Route  Intravenous Administered By  Winsome Delgado RN          riTUXimab-abbs (TRUXIMA) 800 mg in sodium chloride 0.9 % 800 mL NON-oncology infusion     Admin Date  01/28/2022 Action  New Bag Dose  800 mg Route  Intravenous Administered By  Winsome Delgado RN                  /87   Pulse 82   Temp 97.8  F (36.6  C) (Oral)   Resp 16   SpO2 97%

## 2022-02-01 LAB — SCANNED LAB RESULT: ABNORMAL

## 2022-02-02 LAB — SCANNED LAB RESULT: ABNORMAL

## 2022-02-04 ENCOUNTER — INFUSION THERAPY VISIT (OUTPATIENT)
Dept: INFUSION THERAPY | Facility: CLINIC | Age: 27
End: 2022-02-04
Attending: INTERNAL MEDICINE
Payer: COMMERCIAL

## 2022-02-04 VITALS
RESPIRATION RATE: 18 BRPM | OXYGEN SATURATION: 98 % | WEIGHT: 213.4 LBS | SYSTOLIC BLOOD PRESSURE: 117 MMHG | BODY MASS INDEX: 31.51 KG/M2 | HEART RATE: 56 BPM | TEMPERATURE: 98 F | DIASTOLIC BLOOD PRESSURE: 79 MMHG

## 2022-02-04 DIAGNOSIS — M31.19 ACQUIRED TTP (H): Primary | ICD-10-CM

## 2022-02-04 LAB
BASOPHILS # BLD AUTO: 0.1 10E3/UL (ref 0–0.2)
BASOPHILS NFR BLD AUTO: 2 %
EOSINOPHIL # BLD AUTO: 0.2 10E3/UL (ref 0–0.7)
EOSINOPHIL NFR BLD AUTO: 4 %
ERYTHROCYTE [DISTWIDTH] IN BLOOD BY AUTOMATED COUNT: 12.6 % (ref 10–15)
HCT VFR BLD AUTO: 41.1 % (ref 35–47)
HGB BLD-MCNC: 13.7 G/DL (ref 11.7–15.7)
IMM GRANULOCYTES # BLD: 0 10E3/UL
IMM GRANULOCYTES NFR BLD: 1 %
LYMPHOCYTES # BLD AUTO: 0.3 10E3/UL (ref 0.8–5.3)
LYMPHOCYTES NFR BLD AUTO: 9 %
MCH RBC QN AUTO: 27.7 PG (ref 26.5–33)
MCHC RBC AUTO-ENTMCNC: 33.3 G/DL (ref 31.5–36.5)
MCV RBC AUTO: 83 FL (ref 78–100)
MONOCYTES # BLD AUTO: 0.6 10E3/UL (ref 0–1.3)
MONOCYTES NFR BLD AUTO: 15 %
NEUTROPHILS # BLD AUTO: 2.6 10E3/UL (ref 1.6–8.3)
NEUTROPHILS NFR BLD AUTO: 69 %
NRBC # BLD AUTO: 0 10E3/UL
NRBC BLD AUTO-RTO: 0 /100
PLATELET # BLD AUTO: 312 10E3/UL (ref 150–450)
RBC # BLD AUTO: 4.94 10E6/UL (ref 3.8–5.2)
WBC # BLD AUTO: 3.8 10E3/UL (ref 4–11)

## 2022-02-04 PROCEDURE — 85397 CLOTTING FUNCT ACTIVITY: CPT | Performed by: INTERNAL MEDICINE

## 2022-02-04 PROCEDURE — 36415 COLL VENOUS BLD VENIPUNCTURE: CPT | Performed by: INTERNAL MEDICINE

## 2022-02-04 PROCEDURE — 96375 TX/PRO/DX INJ NEW DRUG ADDON: CPT

## 2022-02-04 PROCEDURE — 250N000013 HC RX MED GY IP 250 OP 250 PS 637: Performed by: INTERNAL MEDICINE

## 2022-02-04 PROCEDURE — 96366 THER/PROPH/DIAG IV INF ADDON: CPT

## 2022-02-04 PROCEDURE — 96365 THER/PROPH/DIAG IV INF INIT: CPT

## 2022-02-04 PROCEDURE — 258N000003 HC RX IP 258 OP 636: Performed by: INTERNAL MEDICINE

## 2022-02-04 PROCEDURE — 250N000011 HC RX IP 250 OP 636: Performed by: INTERNAL MEDICINE

## 2022-02-04 PROCEDURE — 85025 COMPLETE CBC W/AUTO DIFF WBC: CPT | Performed by: INTERNAL MEDICINE

## 2022-02-04 RX ORDER — ACETAMINOPHEN 325 MG/1
650 TABLET ORAL ONCE
Status: COMPLETED | OUTPATIENT
Start: 2022-02-04 | End: 2022-02-04

## 2022-02-04 RX ORDER — HEPARIN SODIUM (PORCINE) LOCK FLUSH IV SOLN 100 UNIT/ML 100 UNIT/ML
5 SOLUTION INTRAVENOUS
Status: CANCELLED | OUTPATIENT
Start: 2022-02-11

## 2022-02-04 RX ORDER — ALBUTEROL SULFATE 0.83 MG/ML
2.5 SOLUTION RESPIRATORY (INHALATION)
Status: CANCELLED | OUTPATIENT
Start: 2022-02-11

## 2022-02-04 RX ORDER — NALOXONE HYDROCHLORIDE 0.4 MG/ML
0.2 INJECTION, SOLUTION INTRAMUSCULAR; INTRAVENOUS; SUBCUTANEOUS
Status: CANCELLED | OUTPATIENT
Start: 2022-02-11

## 2022-02-04 RX ORDER — DIPHENHYDRAMINE HYDROCHLORIDE 50 MG/ML
50 INJECTION INTRAMUSCULAR; INTRAVENOUS EVERY 6 HOURS PRN
Status: CANCELLED
Start: 2022-02-11

## 2022-02-04 RX ORDER — ALBUTEROL SULFATE 90 UG/1
1-2 AEROSOL, METERED RESPIRATORY (INHALATION)
Status: CANCELLED
Start: 2022-02-11

## 2022-02-04 RX ORDER — METHYLPREDNISOLONE SODIUM SUCCINATE 125 MG/2ML
125 INJECTION, POWDER, LYOPHILIZED, FOR SOLUTION INTRAMUSCULAR; INTRAVENOUS
Status: CANCELLED
Start: 2022-02-11

## 2022-02-04 RX ORDER — HEPARIN SODIUM,PORCINE 10 UNIT/ML
5 VIAL (ML) INTRAVENOUS
Status: CANCELLED | OUTPATIENT
Start: 2022-02-11

## 2022-02-04 RX ORDER — EPINEPHRINE 1 MG/ML
0.3 INJECTION, SOLUTION INTRAMUSCULAR; SUBCUTANEOUS EVERY 5 MIN PRN
Status: CANCELLED | OUTPATIENT
Start: 2022-02-11

## 2022-02-04 RX ORDER — DIPHENHYDRAMINE HYDROCHLORIDE 50 MG/ML
50 INJECTION INTRAMUSCULAR; INTRAVENOUS
Status: CANCELLED
Start: 2022-02-11

## 2022-02-04 RX ORDER — MEPERIDINE HYDROCHLORIDE 25 MG/ML
25 INJECTION INTRAMUSCULAR; INTRAVENOUS; SUBCUTANEOUS EVERY 30 MIN PRN
Status: CANCELLED | OUTPATIENT
Start: 2022-02-11

## 2022-02-04 RX ORDER — ACETAMINOPHEN 325 MG/1
650 TABLET ORAL ONCE
Status: CANCELLED
Start: 2022-02-11

## 2022-02-04 RX ADMIN — SODIUM CHLORIDE 250 ML: 9 INJECTION, SOLUTION INTRAVENOUS at 12:02

## 2022-02-04 RX ADMIN — RITUXIMAB-ABBS 800 MG: 10 INJECTION, SOLUTION INTRAVENOUS at 12:00

## 2022-02-04 RX ADMIN — DIPHENHYDRAMINE HYDROCHLORIDE 50 MG: 50 INJECTION, SOLUTION INTRAMUSCULAR; INTRAVENOUS at 11:43

## 2022-02-04 RX ADMIN — ACETAMINOPHEN 650 MG: 325 TABLET ORAL at 11:42

## 2022-02-04 NOTE — PROGRESS NOTES
Nursing Note  Zahida DODSON Ilirpepper presents [facility] for:   Chief Complaint   Patient presents with     Infusion     Rituxan     During [facility]'s [facility] appointment, orders from Dr. Modi were completed.  Frequency: weekly. Dose 3/4    Progress note:  Patient identification verified by name and date of birth.  Assessment completed.  Vitals recorded in Doc Flowsheets.  Patient was provided with education regarding medication/procedure and possible side effects.  Patient verbalized understanding.     present during visit today: Not Applicable.    Treatment Conditions: ~~~ NOTE: If the patient answers yes to any of the questions below, hold the infusion and contact ordering provider or on-call provider.    1. Have you recently had an elevated temperature, fever, chills, productive cough, coughing for 3 weeks or longer or hemoptysis, abnormal vital signs, night sweats,  chest pain or have you noticed a decrease in your appetite, unexplained weight loss or fatigue? No  2. Do you have any open wounds or new incisions? No  3. Do you have any recent or upcoming hospitalizations, surgeries or dental procedures? No  4. Do you currently have or recently have had any signs of illness or infection or are you on any antibiotics? No  5. Have you had any new, sudden or worsening abdominal pain? No  6. Have you or anyone in your household received a live vaccination in the past 4 weeks? Please note:  No live vaccines while on biologic/chemotherapy until 6 months after the last treatment.  Patient can receive the flu vaccine (shot only) and the pneumovax.  It is optimal for the patient to get these vaccines mid cycle, but they can be given at any time as long as it is not on the day of the infusion. No  7. Have you recently been diagnosed with any new nervous system diseases (ie. Multiple sclerosis, Guillain [place], seizures, neurological changes) or cancer  diagnosis? No  8. Are you on any form of radiation or chemotherapy? No  9. Are you pregnant or breast feeding or do you have plans of pregnancy in the future? No  10. Have you been having any signs of worsening depression or suicidal ideations?  (benlysta only) No  11. Have there been any other new onset medical symptoms? No    Premedications: administered per order.    Drug Waste Record: No    Infusion length and rate:  infusion starts at 100 ml/hr, then increased by 100 ml/hr every 30 minutes to final rate of 400 ml/hr.    Labs: were drawn during appointment    Vascular access: peripheral IV placed today.    Is the next appt scheduled? yes  Asymptomatic COVID test completed? no    Post Infusion Assessment:  Patient tolerated infusion without incident.     Discharge Plan:   Follow up plan of care with: ongoing infusions at [facility].  Discharge instructions were reviewed with patient.  Patient/representative verbalized understanding of discharge instructions and all questions answered.  Patient discharged from [facility] in stable condition.    Candi Terry RN    Administrations This Visit     0.9% sodium chloride BOLUS     Admin Date  02/04/2022 Action  New Bag Dose  250 mL Route  Intravenous Administered By  Candi Terry RN          acetaminophen (TYLENOL) tablet 650 mg     Admin Date  02/04/2022 Action  Given Dose  650 mg Route  Oral Administered By  Candi Terry RN          diphenhydrAMINE (BENADRYL) 50 mg in sodium chloride 0.9% 50 mL     Admin Date  02/04/2022 Action  New Bag Dose  50 mg Rate  330 mL/hr Route  Intravenous Administered By  Candi Terry RN          riTUXimab-abbs (TRUXIMA) 800 mg in sodium chloride 0.9 % 800 mL NON-oncology infusion     Admin Date  02/04/2022 Action  New Bag Dose  800 mg Route  Intravenous Administered By  Candi Terry RN                /79 (BP Location: Right arm)   Pulse 56   Temp 98   F (36.7  C) (Oral)   Resp 18   Wt 96.8 kg (213 lb 6.4 oz)   SpO2 98%   BMI 31.51 kg/m

## 2022-02-04 NOTE — LETTER
2/4/2022         RE: Zahida Grove  [address]  [facility] [address]        Dear Colleague,    Thank you for referring your patient, Zahida Grove, to the [facility]. Please see a copy of my visit note below.    Nursing Note  Zahida Grove presents [facility] for:   Chief Complaint   Patient presents with     Infusion     Rituxan     During [facility]'s [facility] appointment, orders from Dr. Modi were completed.  Frequency: weekly. Dose 3/4    Progress note:  Patient identification verified by name and date of birth.  Assessment completed.  Vitals recorded in Doc Flowsheets.  Patient was provided with education regarding medication/procedure and possible side effects.  Patient verbalized understanding.     present during visit today: Not Applicable.    Treatment Conditions: ~~~ NOTE: If the patient answers yes to any of the questions below, hold the infusion and contact ordering provider or on-call provider.    1. Have you recently had an elevated temperature, fever, chills, productive cough, coughing for 3 weeks or longer or hemoptysis, abnormal vital signs, night sweats,  chest pain or have you noticed a decrease in your appetite, unexplained weight loss or fatigue? No  2. Do you have any open wounds or new incisions? No  3. Do you have any recent or upcoming hospitalizations, surgeries or dental procedures? No  4. Do you currently have or recently have had any signs of illness or infection or are you on any antibiotics? No  5. Have you had any new, sudden or worsening abdominal pain? No  6. Have you or anyone in your household received a live vaccination in the past 4 weeks? Please note:  No live vaccines while on biologic/chemotherapy until 6 months after the last treatment.  Patient can receive the flu vaccine (shot only) and the pneumovax.  It is optimal for the patient  to get these vaccines mid cycle, but they can be given at any time as long as it is not on the day of the infusion. No  7. Have you recently been diagnosed with any new nervous system diseases (ie. Multiple sclerosis, Guillain [place], seizures, neurological changes) or cancer diagnosis? No  8. Are you on any form of radiation or chemotherapy? No  9. Are you pregnant or breast feeding or do you have plans of pregnancy in the future? No  10. Have you been having any signs of worsening depression or suicidal ideations?  (benlysta only) No  11. Have there been any other new onset medical symptoms? No    Premedications: administered per order.    Drug Waste Record: No    Infusion length and rate:  infusion starts at 100 ml/hr, then increased by 100 ml/hr every 30 minutes to final rate of 400 ml/hr.    Labs: were drawn during appointment    Vascular access: peripheral IV placed today.    Is the next appt scheduled? yes  Asymptomatic COVID test completed? no    Post Infusion Assessment:  Patient tolerated infusion without incident.     Discharge Plan:   Follow up plan of care with: ongoing infusions at [facility].  Discharge instructions were reviewed with patient.  Patient/representative verbalized understanding of discharge instructions and all questions answered.  Patient discharged [facility] in stable condition.    aCndi Terry RN    Administrations This Visit     0.9% sodium chloride BOLUS     Admin Date  02/04/2022 Action  New Bag Dose  250 mL Route  Intravenous Administered By  Candi Terry RN          acetaminophen (TYLENOL) tablet 650 mg     Admin Date  02/04/2022 Action  Given Dose  650 mg Route  Oral Administered By  Candi Terry RN          diphenhydrAMINE (BENADRYL) 50 mg in sodium chloride 0.9% 50 mL     Admin Date  02/04/2022 Action  New Bag Dose  50 mg Rate  330 mL/hr Route  Intravenous Administered By  Candi Terry RN           riTUXimab-abbs (TRUXIMA) 800 mg in sodium chloride 0.9 % 800 mL NON-oncology infusion     Admin Date  02/04/2022 Action  New Bag Dose  800 mg Route  Intravenous Administered By  Candi Terry RN                /79 (BP Location: Right arm)   Pulse 56   Temp 98  F (36.7  C) (Oral)   Resp 18   Wt 96.8 kg (213 lb 6.4 oz)   SpO2 98%   BMI 31.51 kg/m          Again, thank you for allowing me to participate in the care of your patient.        Sincerely,        [facility]

## 2022-02-11 ENCOUNTER — INFUSION THERAPY VISIT (OUTPATIENT)
Dept: INFUSION THERAPY | Facility: CLINIC | Age: 27
End: 2022-02-11
Attending: INTERNAL MEDICINE
Payer: COMMERCIAL

## 2022-02-11 VITALS
WEIGHT: 215.5 LBS | BODY MASS INDEX: 31.82 KG/M2 | SYSTOLIC BLOOD PRESSURE: 109 MMHG | DIASTOLIC BLOOD PRESSURE: 66 MMHG | HEART RATE: 58 BPM | RESPIRATION RATE: 16 BRPM | OXYGEN SATURATION: 98 %

## 2022-02-11 DIAGNOSIS — M31.19 ACQUIRED TTP (H): Primary | ICD-10-CM

## 2022-02-11 DIAGNOSIS — M31.19 T.T.P. SYNDROME (H): ICD-10-CM

## 2022-02-11 LAB
BASOPHILS # BLD AUTO: 0.1 10E3/UL (ref 0–0.2)
BASOPHILS NFR BLD AUTO: 2 %
EOSINOPHIL # BLD AUTO: 0.1 10E3/UL (ref 0–0.7)
EOSINOPHIL NFR BLD AUTO: 3 %
ERYTHROCYTE [DISTWIDTH] IN BLOOD BY AUTOMATED COUNT: 12.5 % (ref 10–15)
HCT VFR BLD AUTO: 40.3 % (ref 35–47)
HGB BLD-MCNC: 13.4 G/DL (ref 11.7–15.7)
IMM GRANULOCYTES # BLD: 0 10E3/UL
IMM GRANULOCYTES NFR BLD: 0 %
LDH SERPL L TO P-CCNC: 161 U/L (ref 81–234)
LYMPHOCYTES # BLD AUTO: 0.3 10E3/UL (ref 0.8–5.3)
LYMPHOCYTES NFR BLD AUTO: 8 %
MCH RBC QN AUTO: 27.4 PG (ref 26.5–33)
MCHC RBC AUTO-ENTMCNC: 33.3 G/DL (ref 31.5–36.5)
MCV RBC AUTO: 82 FL (ref 78–100)
MONOCYTES # BLD AUTO: 0.6 10E3/UL (ref 0–1.3)
MONOCYTES NFR BLD AUTO: 14 %
NEUTROPHILS # BLD AUTO: 2.8 10E3/UL (ref 1.6–8.3)
NEUTROPHILS NFR BLD AUTO: 73 %
NRBC # BLD AUTO: 0 10E3/UL
NRBC BLD AUTO-RTO: 0 /100
PLATELET # BLD AUTO: 319 10E3/UL (ref 150–450)
RBC # BLD AUTO: 4.89 10E6/UL (ref 3.8–5.2)
WBC # BLD AUTO: 3.9 10E3/UL (ref 4–11)

## 2022-02-11 PROCEDURE — 85025 COMPLETE CBC W/AUTO DIFF WBC: CPT | Performed by: INTERNAL MEDICINE

## 2022-02-11 PROCEDURE — 83615 LACTATE (LD) (LDH) ENZYME: CPT

## 2022-02-11 PROCEDURE — 250N000013 HC RX MED GY IP 250 OP 250 PS 637: Performed by: INTERNAL MEDICINE

## 2022-02-11 PROCEDURE — 96413 CHEMO IV INFUSION 1 HR: CPT

## 2022-02-11 PROCEDURE — 36415 COLL VENOUS BLD VENIPUNCTURE: CPT

## 2022-02-11 PROCEDURE — 96375 TX/PRO/DX INJ NEW DRUG ADDON: CPT

## 2022-02-11 PROCEDURE — 250N000011 HC RX IP 250 OP 636: Performed by: INTERNAL MEDICINE

## 2022-02-11 PROCEDURE — 96415 CHEMO IV INFUSION ADDL HR: CPT

## 2022-02-11 PROCEDURE — 258N000003 HC RX IP 258 OP 636: Performed by: INTERNAL MEDICINE

## 2022-02-11 PROCEDURE — 85397 CLOTTING FUNCT ACTIVITY: CPT | Performed by: INTERNAL MEDICINE

## 2022-02-11 RX ORDER — METHYLPREDNISOLONE SODIUM SUCCINATE 125 MG/2ML
125 INJECTION, POWDER, LYOPHILIZED, FOR SOLUTION INTRAMUSCULAR; INTRAVENOUS
Status: CANCELLED
Start: 2022-02-11

## 2022-02-11 RX ORDER — ACETAMINOPHEN 325 MG/1
650 TABLET ORAL ONCE
Status: COMPLETED | OUTPATIENT
Start: 2022-02-11 | End: 2022-02-11

## 2022-02-11 RX ORDER — HEPARIN SODIUM (PORCINE) LOCK FLUSH IV SOLN 100 UNIT/ML 100 UNIT/ML
5 SOLUTION INTRAVENOUS
Status: CANCELLED | OUTPATIENT
Start: 2022-02-11

## 2022-02-11 RX ORDER — DIPHENHYDRAMINE HYDROCHLORIDE 50 MG/ML
50 INJECTION INTRAMUSCULAR; INTRAVENOUS EVERY 6 HOURS PRN
Status: DISCONTINUED | OUTPATIENT
Start: 2022-02-11 | End: 2022-02-11

## 2022-02-11 RX ORDER — ALBUTEROL SULFATE 90 UG/1
1-2 AEROSOL, METERED RESPIRATORY (INHALATION)
Status: CANCELLED
Start: 2022-02-11

## 2022-02-11 RX ORDER — HEPARIN SODIUM,PORCINE 10 UNIT/ML
5 VIAL (ML) INTRAVENOUS
Status: CANCELLED | OUTPATIENT
Start: 2022-02-11

## 2022-02-11 RX ORDER — DIPHENHYDRAMINE HYDROCHLORIDE 50 MG/ML
50 INJECTION INTRAMUSCULAR; INTRAVENOUS
Status: CANCELLED
Start: 2022-02-11

## 2022-02-11 RX ORDER — DIPHENHYDRAMINE HYDROCHLORIDE 50 MG/ML
50 INJECTION INTRAMUSCULAR; INTRAVENOUS EVERY 6 HOURS PRN
Status: CANCELLED
Start: 2022-02-11

## 2022-02-11 RX ORDER — NALOXONE HYDROCHLORIDE 0.4 MG/ML
0.2 INJECTION, SOLUTION INTRAMUSCULAR; INTRAVENOUS; SUBCUTANEOUS
Status: CANCELLED | OUTPATIENT
Start: 2022-02-11

## 2022-02-11 RX ORDER — ALBUTEROL SULFATE 0.83 MG/ML
2.5 SOLUTION RESPIRATORY (INHALATION)
Status: CANCELLED | OUTPATIENT
Start: 2022-02-11

## 2022-02-11 RX ORDER — MEPERIDINE HYDROCHLORIDE 25 MG/ML
25 INJECTION INTRAMUSCULAR; INTRAVENOUS; SUBCUTANEOUS EVERY 30 MIN PRN
Status: CANCELLED | OUTPATIENT
Start: 2022-02-11

## 2022-02-11 RX ORDER — EPINEPHRINE 1 MG/ML
0.3 INJECTION, SOLUTION INTRAMUSCULAR; SUBCUTANEOUS EVERY 5 MIN PRN
Status: CANCELLED | OUTPATIENT
Start: 2022-02-11

## 2022-02-11 RX ORDER — ACETAMINOPHEN 325 MG/1
650 TABLET ORAL ONCE
Status: CANCELLED
Start: 2022-02-11

## 2022-02-11 RX ADMIN — ACETAMINOPHEN 650 MG: 325 TABLET ORAL at 11:35

## 2022-02-11 RX ADMIN — DIPHENHYDRAMINE HYDROCHLORIDE 50 MG: 50 INJECTION, SOLUTION INTRAMUSCULAR; INTRAVENOUS at 11:51

## 2022-02-11 RX ADMIN — RITUXIMAB-ABBS 800 MG: 10 INJECTION, SOLUTION INTRAVENOUS at 12:26

## 2022-02-11 ASSESSMENT — PAIN SCALES - GENERAL: PAINLEVEL: NO PAIN (0)

## 2022-02-11 NOTE — LETTER
2/11/2022         RE: Zahida Grove  [address]  [facility] [address]        Dear Colleague,    Thank you for referring your patient, Zahida Grove, to the [facility]. Please see a copy of my visit note below.    Infusion Nursing Note:  Zahida Grove presents today for Rituximab 4/4.    Patient seen by provider today: No   present during visit today: Not Applicable.    Note:   -Premedications: Tylenol, IV Benadryl  -Rituximab started at 100 mL/hr and increased by 100 mL/hr every 30 minutes to a max rate of 400 mL/hr    Intravenous Access:  Labs drawn without difficulty.  Peripheral IV placed.    Treatment Conditions:  Biological Infusion Checklist:  ~~~ NOTE: If the patient answers yes to any of the questions below, hold the infusion and contact ordering provider or on-call provider.    1. Have you recently had an elevated temperature, fever, chills, productive cough, coughing for 3 weeks or longer or hemoptysis, abnormal vital signs, night sweats,  chest pain or have you noticed a decrease in your appetite, unexplained weight loss or fatigue? No  2. Do you have any open wounds or new incisions? No  3. Do you have any recent or upcoming hospitalizations, surgeries or dental procedures? No  4. Do you currently have or recently have had any signs of illness or infection or are you on any antibiotics? No  5. Have you had any new, sudden or worsening abdominal pain? No  6. Have you or anyone in your household received a live vaccination in the past 4 weeks? Please note:  No live vaccines while on biologic/chemotherapy until 6 months after the last treatment.  Patient can receive the flu vaccine (shot only) and the pneumovax.  It is optimal for the patient to get these vaccines mid cycle, but they can be given at any time as long as it is not on the day of the infusion. No  7. Have you recently been diagnosed with any new nervous system diseases  (ie. Multiple sclerosis, Guillain [place], seizures, neurological changes) or cancer diagnosis? No  8. Are you on any form of radiation or chemotherapy? No  9. Are you pregnant or breast feeding or do you have plans of pregnancy in the future? No  10. Have you been having any signs of worsening depression or suicidal ideations?  (benlysta only) N/A  11. Have there been any other new onset medical symptoms? No    Post Infusion Assessment:  Patient tolerated infusion without incident.  Blood return noted pre and post infusion.  Site patent and intact, free from redness, edema or discomfort.  No evidence of extravasations.  Access discontinued per protocol.  Biologic Infusion Post Education: Call the triage nurse at your clinic or seek medical attention if you have chills and/or temperature greater than or equal to 100.5, uncontrolled nausea/vomiting, diarrhea, constipation, dizziness, shortness of breath, chest pain, heart palpitations, weakness or any other new or concerning symptoms, questions or concerns.  You cannot have any live virus vaccines prior to or during treatment or up to 6 months post infusion.  If you have an upcoming surgery, medical procedure or dental procedure during treatment, this should be discussed with your ordering physician and your surgeon/dentist.  If you are having any concerning symptom, if you are unsure if you should get your next infusion or wish to speak to a provider before your next infusion, please call your care coordinator or triage nurse at your clinic to notify them so we can adequately serve you.       Discharge Plan:   AVS to patient via MYCHART.  Patient will return to PCP for next appointment- 4 doses completed.   Patient discharged in stable condition accompanied by: self.  Departure Mode: Ambulatory.    Kristy Singleton RN    /71 (BP Location: Left arm)   Pulse 57   Resp 16   Wt 97.8 kg (215 lb 8 oz)   SpO2 98%   BMI 31.82 kg/m      Administrations This Visit      acetaminophen (TYLENOL) tablet 650 mg     Admin Date  02/11/2022 Action  Given Dose  650 mg Route  Oral Administered By  Kristy Singleton, RN          diphenhydrAMINE (BENADRYL) 50 mg in sodium chloride 0.9 % 51 mL intermittent infusion     Admin Date  02/11/2022 Action  New Bag Dose  50 mg Rate  306 mL/hr Route  Intravenous Administered By  Kristy Singleton, GERA          riTUXimab-abbs (TRUXIMA) 800 mg in sodium chloride 0.9 % 800 mL NON-oncology infusion     Admin Date  02/11/2022 Action  New Bag Dose  800 mg Route  Intravenous Administered By  Fanta Garcia RN                  Again, thank you for allowing me to participate in the care of your patient.      Sincerely,    [facility]

## 2022-02-11 NOTE — PROGRESS NOTES
Infusion Nursing Note:  Zahida Grove presents today for Rituximab 4/4.    Patient seen by provider today: No   present during visit today: Not Applicable.    Note:   -Premedications: Tylenol, IV Benadryl  -Rituximab started at 100 mL/hr and increased by 100 mL/hr every 30 minutes to a max rate of 400 mL/hr    Intravenous Access:  Labs drawn without difficulty.  Peripheral IV placed.    Treatment Conditions:  Biological Infusion Checklist:  ~~~ NOTE: If the patient answers yes to any of the questions below, hold the infusion and contact ordering provider or on-call provider.    1. Have you recently had an elevated temperature, fever, chills, productive cough, coughing for 3 weeks or longer or hemoptysis, abnormal vital signs, night sweats,  chest pain or have you noticed a decrease in your appetite, unexplained weight loss or fatigue? No  2. Do you have any open wounds or new incisions? No  3. Do you have any recent or upcoming hospitalizations, surgeries or dental procedures? No  4. Do you currently have or recently have had any signs of illness or infection or are you on any antibiotics? No  5. Have you had any new, sudden or worsening abdominal pain? No  6. Have you or anyone in your household received a live vaccination in the past 4 weeks? Please note:  No live vaccines while on biologic/chemotherapy until 6 months after the last treatment.  Patient can receive the flu vaccine (shot only) and the pneumovax.  It is optimal for the patient to get these vaccines mid cycle, but they can be given at any time as long as it is not on the day of the infusion. No  7. Have you recently been diagnosed with any new nervous system diseases (ie. Multiple sclerosis, Guillain [place], seizures, neurological changes) or cancer diagnosis? No  8. Are you on any form of radiation or chemotherapy? No  9. Are you pregnant or breast feeding or do you have plans of pregnancy in the future? No  10. Have you been having any  signs of worsening depression or suicidal ideations?  (benlysta only) N/A  11. Have there been any other new onset medical symptoms? No    Post Infusion Assessment:  Patient tolerated infusion without incident.  Blood return noted pre and post infusion.  Site patent and intact, free from redness, edema or discomfort.  No evidence of extravasations.  Access discontinued per protocol.  Biologic Infusion Post Education: Call the triage nurse at your clinic or seek medical attention if you have chills and/or temperature greater than or equal to 100.5, uncontrolled nausea/vomiting, diarrhea, constipation, dizziness, shortness of breath, chest pain, heart palpitations, weakness or any other new or concerning symptoms, questions or concerns.  You cannot have any live virus vaccines prior to or during treatment or up to 6 months post infusion.  If you have an upcoming surgery, medical procedure or dental procedure during treatment, this should be discussed with your ordering physician and your surgeon/dentist.  If you are having any concerning symptom, if you are unsure if you should get your next infusion or wish to speak to a provider before your next infusion, please call your care coordinator or triage nurse at your clinic to notify them so we can adequately serve you.       Discharge Plan:   AVS to patient via MYCHART.  Patient will return to PCP for next appointment- 4 doses completed.   Patient discharged in stable condition accompanied by: self.  Departure Mode: Ambulatory.    Kristy Singleton, RN    /71 (BP Location: Left arm)   Pulse 57   Resp 16   Wt 97.8 kg (215 lb 8 oz)   SpO2 98%   BMI 31.82 kg/m      Administrations This Visit     acetaminophen (TYLENOL) tablet 650 mg     Admin Date  02/11/2022 Action  Given Dose  650 mg Route  Oral Administered By  Kristy Singleton RN          diphenhydrAMINE (BENADRYL) 50 mg in sodium chloride 0.9 % 51 mL intermittent infusion     Admin Date  02/11/2022  Action  New Bag Dose  50 mg Rate  306 mL/hr Route  Intravenous Administered By  Kristy Singleton, RN          riTUXimab-abbs (TRUXIMA) 800 mg in sodium chloride 0.9 % 800 mL NON-oncology infusion     Admin Date  02/11/2022 Action  New Bag Dose  800 mg Route  Intravenous Administered By  Fanta Garcia RN

## 2022-02-11 NOTE — PATIENT INSTRUCTIONS
Dear Zahida Grove    Thank you for choosing [facility] ([facility]) for your infusion.  The following information is a summary of our appointment as well as important reminders.      We look forward in seeing you on your next appointment here at [facility]  Please don t hesitate to call us at [phone] to reschedule any of your appointments or to speak with one of the [facility] registered nurses.  It was a pleasure taking care of you today.    Sincerely,    [facility]  [address] [place] [address]  [address]  Phone:  [phone]  Patient Education     Rituximab Solution for injection  What is this medicine?  RITUXIMAB (ri TUX i mab) is a monoclonal antibody. This medicine changes the way the body's immune system works. It is used commonly to treat non-Hodgkin lymphoma and other conditions. In cancer cells, this drug targets a specific protein within cancer cells and stops the cancer cells from growing. It is also used to treat rheumatoid arthritis (RA). In RA, this medicine slows the inflammatory process and help reduce joint pain and swelling. This medicine is often used with other cancer or arthritis medications.  This medicine may be used for other purposes; ask your health care provider or pharmacist if you have questions.  What should I tell my health care provider before I take this medicine?  They need to know if you have any of these conditions:    blood disorders    heart disease    history of hepatitis B    infection (especially a virus infection such as chickenpox, cold sores, or herpes)    irregular heartbeat    kidney disease    lung or breathing disease, like asthma    lupus    an unusual or allergic reaction to rituximab, mouse proteins, other medicines, foods, dyes, or preservatives    pregnant or trying to get  pregnant    breast-feeding  How should I use this medicine?  This medicine is for infusion into a vein. It is administered in a hospital or clinic by a specially trained health care professional.  A special MedGuide will be given to you by the pharmacist with each prescription and refill. Be sure to read this information carefully each time.  Talk to your pediatrician regarding the use of this medicine in children. This medicine is not approved for use in children.  Overdosage: If you think you have taken too much of this medicine contact a poison control center or emergency room at once.  NOTE: This medicine is only for you. Do not share this medicine with others.  What if I miss a dose?  It is important not to miss a dose. Call your doctor or health care professional if you are unable to keep an appointment.  What may interact with this medicine?    cisplatin    medicines for blood pressure    some other medicines for arthritis    vaccines  This list may not describe all possible interactions. Give your health care provider a list of all the medicines, herbs, non-prescription drugs, or dietary supplements you use. Also tell them if you smoke, drink alcohol, or use illegal drugs. Some items may interact with your medicine.  What should I watch for while using this medicine?  Report any side effects that you notice during your treatment right away, such as changes in your breathing, fever, chills, dizziness or lightheadedness. These effects are more common with the first dose.  Visit your prescriber or health care professional for checks on your progress. You will need to have regular blood work. Report any other side effects. The side effects of this medicine can continue after you finish your treatment. Continue your course of treatment even though you feel ill unless your doctor tells you to stop.  Call your doctor or health care professional for advice if you get a fever, chills or sore throat, or other symptoms  of a cold or flu. Do not treat yourself. This drug decreases your body's ability to fight infections. Try to avoid being around people who are sick.  This medicine may increase your risk to bruise or bleed. Call your doctor or health care professional if you notice any unusual bleeding.  Be careful brushing and flossing your teeth or using a toothpick because you may get an infection or bleed more easily. If you have any dental work done, tell your dentist you are receiving this medicine.  Avoid taking products that contain aspirin, acetaminophen, ibuprofen, naproxen, or ketoprofen unless instructed by your doctor. These medicines may hide a fever.  Do not become pregnant while taking this medicine. Women should inform their doctor if they wish to become pregnant or think they might be pregnant. There is a potential for serious side effects to an unborn child. Talk to your health care professional or pharmacist for more information. Do not breast-feed an infant while taking this medicine.  What side effects may I notice from receiving this medicine?  Side effects that you should report to your doctor or health care professional as soon as possible:    allergic reactions like skin rash, itching or hives, swelling of the face, lips, or tongue    low blood counts - this medicine may decrease the number of white blood cells, red blood cells and platelets. You may be at increased risk for infections and bleeding.    signs of infection - fever or chills, cough, sore throat, pain or difficulty passing urine    signs of decreased platelets or bleeding - bruising, pinpoint red spots on the skin, black, tarry stools, blood in the urine    signs of decreased red blood cells - unusually weak or tired, fainting spells, lightheadedness    breathing problems    confused, not responsive    chest pain    fast, irregular heartbeat    feeling faint or lightheaded, falls    mouth sores    redness, blistering, peeling or loosening of  the skin, including inside the mouth    stomach pain    swelling of the ankles, feet, or hands    trouble passing urine or change in the amount of urine  Side effects that usually do not require medical attention (report to your doctor or other health care professional if they continue or are bothersome):    anxiety    headache    loss of appetite    muscle aches    nausea    night sweats  This list may not describe all possible side effects. Call your doctor for medical advice about side effects. You may report side effects to FDA at 6-203-QNJ-4574.  Where should I keep my medicine?  This drug is given in a hospital or clinic and will not be stored at home.  NOTE:This sheet is a summary. It may not cover all possible information. If you have questions about this medicine, talk to your doctor, pharmacist, or health care provider. Copyright  2016 Gold Standard

## 2022-02-14 LAB — SCANNED LAB RESULT: NORMAL

## 2022-02-16 LAB — SCANNED LAB RESULT: NORMAL

## 2022-05-08 ENCOUNTER — HEALTH MAINTENANCE LETTER (OUTPATIENT)
Age: 27
End: 2022-05-08

## 2022-07-14 ENCOUNTER — THERAPY VISIT (OUTPATIENT)
Dept: HEMATOLOGY | Facility: CLINIC | Age: 27
End: 2022-07-14
Attending: INTERNAL MEDICINE
Payer: COMMERCIAL

## 2022-07-14 DIAGNOSIS — M31.19 T.T.P. SYNDROME (H): ICD-10-CM

## 2022-07-14 DIAGNOSIS — M31.19 ACQUIRED TTP (H): Primary | ICD-10-CM

## 2022-07-14 LAB
BASOPHILS # BLD AUTO: 0 10E3/UL (ref 0–0.2)
BASOPHILS NFR BLD AUTO: 1 %
EOSINOPHIL # BLD AUTO: 0.2 10E3/UL (ref 0–0.7)
EOSINOPHIL NFR BLD AUTO: 6 %
ERYTHROCYTE [DISTWIDTH] IN BLOOD BY AUTOMATED COUNT: 13.2 % (ref 10–15)
HCT VFR BLD AUTO: 42.9 % (ref 35–47)
HGB BLD-MCNC: 14.8 G/DL (ref 11.7–15.7)
IMM GRANULOCYTES # BLD: 0 10E3/UL
IMM GRANULOCYTES NFR BLD: 0 %
LDH SERPL L TO P-CCNC: 172 U/L (ref 81–234)
LYMPHOCYTES # BLD AUTO: 0.4 10E3/UL (ref 0.8–5.3)
LYMPHOCYTES NFR BLD AUTO: 11 %
MCH RBC QN AUTO: 27.8 PG (ref 26.5–33)
MCHC RBC AUTO-ENTMCNC: 34.5 G/DL (ref 31.5–36.5)
MCV RBC AUTO: 81 FL (ref 78–100)
MONOCYTES # BLD AUTO: 0.7 10E3/UL (ref 0–1.3)
MONOCYTES NFR BLD AUTO: 17 %
NEUTROPHILS # BLD AUTO: 2.6 10E3/UL (ref 1.6–8.3)
NEUTROPHILS NFR BLD AUTO: 65 %
NRBC # BLD AUTO: 0 10E3/UL
NRBC BLD AUTO-RTO: 0 /100
PLATELET # BLD AUTO: 335 10E3/UL (ref 150–450)
RBC # BLD AUTO: 5.33 10E6/UL (ref 3.8–5.2)
WBC # BLD AUTO: 4 10E3/UL (ref 4–11)

## 2022-07-14 PROCEDURE — 85397 CLOTTING FUNCT ACTIVITY: CPT | Performed by: INTERNAL MEDICINE

## 2022-07-14 PROCEDURE — 36415 COLL VENOUS BLD VENIPUNCTURE: CPT | Performed by: INTERNAL MEDICINE

## 2022-07-14 PROCEDURE — 85025 COMPLETE CBC W/AUTO DIFF WBC: CPT | Performed by: INTERNAL MEDICINE

## 2022-07-14 PROCEDURE — 99001 SPECIMEN HANDLING PT-LAB: CPT | Performed by: INTERNAL MEDICINE

## 2022-07-14 PROCEDURE — 99207 PR NO CHARGE-RESEARCH SERVICE: CPT | Performed by: INTERNAL MEDICINE

## 2022-07-14 PROCEDURE — 83615 LACTATE (LD) (LDH) ENZYME: CPT | Performed by: INTERNAL MEDICINE

## 2022-07-14 NOTE — PROGRESS NOTES
Closeout visit for [facility] P201 was completed today without any complications or concerns.    Damián Modi MD   of Medicine, Division of Hematology, Oncology and Transplantation  [employer] [facility] [employer]

## 2022-07-18 LAB — SCANNED LAB RESULT: NORMAL

## 2022-12-01 ENCOUNTER — TELEPHONE (OUTPATIENT)
Dept: HEMATOLOGY | Facility: CLINIC | Age: 27
End: 2022-12-01

## 2022-12-01 DIAGNOSIS — M31.19 ACQUIRED TTP (H): Primary | ICD-10-CM

## 2022-12-01 NOTE — TELEPHONE ENCOUNTER
Zahida Grove is a 27yr old seen by the [facility] for her dx of acquired TTP.     Zahida is calling today in preparation for traveling out of the country on . She will be gone for 3 weeks in [place]. Zahida is wondering if she can get her labs drawn and believes her standing orders have . She is also interested to know if Dr. Modi has any other recommendation prior to her travel, such as vaccines.     Plan made for staff to discuss this with Dr. Modi today and will call Zahida back with an update.    Silvana BREAUX, RN, N   [facility]   Office: [phone]  Fax: [phone]     Addendum:    Spoke to Dr. Modi about this patient's question. He approved reinstating the standing labs for; CBC with platelets, llhaGD17 activity and lactate dehydrogenase. He will defer to the travel clinic that Zahida is seeing tomorrow as to which vaccines would be needed.    Staff called Zahida back and relayed this information and scheduled a lab appointment for 330pm at [facility] tomorrow.     Silvana BREAUX, RN, PHN   [facility]   Office: [phone]  Fax: [phone]

## 2022-12-02 ENCOUNTER — LAB (OUTPATIENT)
Dept: LAB | Facility: CLINIC | Age: 27
End: 2022-12-02
Payer: COMMERCIAL

## 2022-12-02 DIAGNOSIS — M31.19 ACQUIRED TTP (H): ICD-10-CM

## 2022-12-02 LAB
ERYTHROCYTE [DISTWIDTH] IN BLOOD BY AUTOMATED COUNT: 12.5 % (ref 10–15)
HCT VFR BLD AUTO: 43.3 % (ref 35–47)
HGB BLD-MCNC: 14.5 G/DL (ref 11.7–15.7)
MCH RBC QN AUTO: 28.1 PG (ref 26.5–33)
MCHC RBC AUTO-ENTMCNC: 33.5 G/DL (ref 31.5–36.5)
MCV RBC AUTO: 84 FL (ref 78–100)
PLATELET # BLD AUTO: 354 10E3/UL (ref 150–450)
RBC # BLD AUTO: 5.16 10E6/UL (ref 3.8–5.2)
WBC # BLD AUTO: 5.9 10E3/UL (ref 4–11)

## 2022-12-02 PROCEDURE — 83615 LACTATE (LD) (LDH) ENZYME: CPT

## 2022-12-02 PROCEDURE — 36415 COLL VENOUS BLD VENIPUNCTURE: CPT

## 2022-12-02 PROCEDURE — 85397 CLOTTING FUNCT ACTIVITY: CPT | Mod: 90

## 2022-12-02 PROCEDURE — 99000 SPECIMEN HANDLING OFFICE-LAB: CPT

## 2022-12-02 PROCEDURE — 85027 COMPLETE CBC AUTOMATED: CPT

## 2022-12-03 LAB — LDH SERPL L TO P-CCNC: 217 U/L (ref 0–250)

## 2022-12-07 LAB — SCANNED LAB RESULT: NORMAL

## 2023-01-08 ENCOUNTER — HEALTH MAINTENANCE LETTER (OUTPATIENT)
Age: 28
End: 2023-01-08

## 2023-03-23 ENCOUNTER — LAB (OUTPATIENT)
Dept: LAB | Facility: CLINIC | Age: 28
End: 2023-03-23
Payer: COMMERCIAL

## 2023-03-23 DIAGNOSIS — M31.19 ACQUIRED TTP (H): ICD-10-CM

## 2023-03-23 LAB
ERYTHROCYTE [DISTWIDTH] IN BLOOD BY AUTOMATED COUNT: 13.3 % (ref 10–15)
HCT VFR BLD AUTO: 42.1 % (ref 35–47)
HGB BLD-MCNC: 14.5 G/DL (ref 11.7–15.7)
MCH RBC QN AUTO: 28.1 PG (ref 26.5–33)
MCHC RBC AUTO-ENTMCNC: 34.4 G/DL (ref 31.5–36.5)
MCV RBC AUTO: 82 FL (ref 78–100)
PLATELET # BLD AUTO: 325 10E3/UL (ref 150–450)
RBC # BLD AUTO: 5.16 10E6/UL (ref 3.8–5.2)
WBC # BLD AUTO: 5.7 10E3/UL (ref 4–11)

## 2023-03-23 PROCEDURE — 36415 COLL VENOUS BLD VENIPUNCTURE: CPT

## 2023-03-23 PROCEDURE — 85027 COMPLETE CBC AUTOMATED: CPT

## 2023-03-23 PROCEDURE — 85397 CLOTTING FUNCT ACTIVITY: CPT | Mod: 90

## 2023-03-23 PROCEDURE — 99000 SPECIMEN HANDLING OFFICE-LAB: CPT

## 2023-03-23 PROCEDURE — 83615 LACTATE (LD) (LDH) ENZYME: CPT

## 2023-03-24 LAB — LDH SERPL L TO P-CCNC: 204 U/L (ref 0–250)

## 2023-03-28 LAB — SCANNED LAB RESULT: NORMAL

## 2023-06-02 ENCOUNTER — HEALTH MAINTENANCE LETTER (OUTPATIENT)
Age: 28
End: 2023-06-02

## 2023-10-24 ENCOUNTER — TELEPHONE (OUTPATIENT)
Dept: HEMATOLOGY | Facility: CLINIC | Age: 28
End: 2023-10-24
Payer: COMMERCIAL

## 2023-10-24 NOTE — TELEPHONE ENCOUNTER
[id number]  Zahida DODSON Ilirpepper  28 year old female  [facility] Diagnosis: TTP  [facility] Provider: Rian Modi is requesting a blood draw for the research study to be drawn at our clinic. We could do the CBC & [id number] at the same time. Patient is willing to come in on 11/1/23 at 9 am.  Patient is wondering if there is paperwork to be completed as well, so she can a lot time from work if needed.    Will route to Millie Alicea, research coordinator and discuss with Dr. Modi and give patient a call later this week.  Yessica Carty, MSN, RN, PHN -Nurse Clinician, [facility] [phone]

## 2023-11-01 ENCOUNTER — ALLIED HEALTH/NURSE VISIT (OUTPATIENT)
Dept: HEMATOLOGY | Facility: CLINIC | Age: 28
End: 2023-11-01
Attending: INTERNAL MEDICINE
Payer: COMMERCIAL

## 2023-11-01 DIAGNOSIS — M31.19 ACQUIRED TTP (H): Primary | ICD-10-CM

## 2023-11-01 LAB
ERYTHROCYTE [DISTWIDTH] IN BLOOD BY AUTOMATED COUNT: 13.1 % (ref 10–15)
HCT VFR BLD AUTO: 41.1 % (ref 35–47)
HGB BLD-MCNC: 13.7 G/DL (ref 11.7–15.7)
LDH SERPL L TO P-CCNC: 191 U/L (ref 0–250)
MCH RBC QN AUTO: 27.9 PG (ref 26.5–33)
MCHC RBC AUTO-ENTMCNC: 33.3 G/DL (ref 31.5–36.5)
MCV RBC AUTO: 84 FL (ref 78–100)
PLATELET # BLD AUTO: 336 10E3/UL (ref 150–450)
RBC # BLD AUTO: 4.91 10E6/UL (ref 3.8–5.2)
WBC # BLD AUTO: 6.7 10E3/UL (ref 4–11)

## 2023-11-01 PROCEDURE — 99207 PR NO BILLABLE SERVICE THIS VISIT: CPT

## 2023-11-01 PROCEDURE — 83615 LACTATE (LD) (LDH) ENZYME: CPT

## 2023-11-01 PROCEDURE — 85027 COMPLETE CBC AUTOMATED: CPT

## 2023-11-01 PROCEDURE — 85397 CLOTTING FUNCT ACTIVITY: CPT

## 2023-11-01 PROCEDURE — 36415 COLL VENOUS BLD VENIPUNCTURE: CPT

## 2023-11-01 NOTE — PROGRESS NOTES
Hematology Staff Note  I met with Zahida today during her lab visit.  She feels well and has no concerns today.  We discussed updates in iTTP management with our 2 open clinical trials of management of iTTP without PLEX.  We may be also able to offer a clinical trial in the near future for an investigational drug for prophylaxis.  We discussed our monitoring strategy of doing testing every 3 months.    Damián Modi MD   of Medicine, Division of Hematology, Oncology and Transplantation  [facility]

## 2023-11-02 LAB
COAGULATION SPECIALIST REVIEW: NORMAL
VWF CP ACT/NOR PPP CHRO: >100 %

## 2024-06-03 ENCOUNTER — TELEPHONE (OUTPATIENT)
Dept: HEMATOLOGY | Facility: CLINIC | Age: 29
End: 2024-06-03
Payer: COMMERCIAL

## 2024-06-03 NOTE — CONFIDENTIAL NOTE
[id number]  Zahida Grove  28 year old female  [facility] Diagnosis: iTTP  [facility] Provider: Rian    RN called Zahida regarding need for follow up appointment.   She needs labs, (CBC, [id number], LDH) and a return visit with Dr. Modi. She did not answer. A detailed voicemail was left and call back requested.     Kely Denise  MSN, RN, PHN  [facility]  Office: [phone]  Fax: [phone]    Addendum 6/5/2024 11:32 AM:  RN called patient again and left a voicemail with call-back number.    Neida Thompson RN, BSN, PCCN  Nurse Clinician    [facility]  [address] [place] [address], [address], [place] [address]   Office, direct: [phone]  Main office number: [phone]  Pronouns: She, her, hers

## 2024-06-29 ENCOUNTER — HEALTH MAINTENANCE LETTER (OUTPATIENT)
Age: 29
End: 2024-06-29

## 2025-03-12 ENCOUNTER — OFFICE VISIT (OUTPATIENT)
Dept: HEMATOLOGY | Facility: CLINIC | Age: 30
End: 2025-03-12
Attending: INTERNAL MEDICINE
Payer: COMMERCIAL

## 2025-03-12 VITALS
HEIGHT: 69 IN | DIASTOLIC BLOOD PRESSURE: 73 MMHG | BODY MASS INDEX: 33.13 KG/M2 | TEMPERATURE: 97.4 F | SYSTOLIC BLOOD PRESSURE: 110 MMHG | WEIGHT: 223.7 LBS | HEART RATE: 70 BPM | OXYGEN SATURATION: 100 %

## 2025-03-12 DIAGNOSIS — M31.19 ACQUIRED TTP (H): Primary | ICD-10-CM

## 2025-03-12 LAB
BASOPHILS # BLD AUTO: 0.1 10E3/UL (ref 0–0.2)
BASOPHILS NFR BLD AUTO: 1 %
EOSINOPHIL # BLD AUTO: 0.2 10E3/UL (ref 0–0.7)
EOSINOPHIL NFR BLD AUTO: 3 %
ERYTHROCYTE [DISTWIDTH] IN BLOOD BY AUTOMATED COUNT: 13 % (ref 10–15)
HCT VFR BLD AUTO: 41.2 % (ref 35–47)
HGB BLD-MCNC: 14.3 G/DL (ref 11.7–15.7)
IMM GRANULOCYTES # BLD: 0 10E3/UL
IMM GRANULOCYTES NFR BLD: 0 %
LDH SERPL L TO P-CCNC: 246 U/L (ref 0–250)
LYMPHOCYTES # BLD AUTO: 0.8 10E3/UL (ref 0.8–5.3)
LYMPHOCYTES NFR BLD AUTO: 13 %
MCH RBC QN AUTO: 28.3 PG (ref 26.5–33)
MCHC RBC AUTO-ENTMCNC: 34.7 G/DL (ref 31.5–36.5)
MCV RBC AUTO: 81 FL (ref 78–100)
MONOCYTES # BLD AUTO: 0.7 10E3/UL (ref 0–1.3)
MONOCYTES NFR BLD AUTO: 11 %
NEUTROPHILS # BLD AUTO: 4.9 10E3/UL (ref 1.6–8.3)
NEUTROPHILS NFR BLD AUTO: 73 %
NRBC # BLD AUTO: 0 10E3/UL
NRBC BLD AUTO-RTO: 0 /100
PLATELET # BLD AUTO: 367 10E3/UL (ref 150–450)
RBC # BLD AUTO: 5.06 10E6/UL (ref 3.8–5.2)
WBC # BLD AUTO: 6.7 10E3/UL (ref 4–11)

## 2025-03-12 PROCEDURE — 36415 COLL VENOUS BLD VENIPUNCTURE: CPT | Performed by: INTERNAL MEDICINE

## 2025-03-12 PROCEDURE — 99213 OFFICE O/P EST LOW 20 MIN: CPT | Performed by: INTERNAL MEDICINE

## 2025-03-12 PROCEDURE — 85004 AUTOMATED DIFF WBC COUNT: CPT | Performed by: INTERNAL MEDICINE

## 2025-03-12 PROCEDURE — 83615 LACTATE (LD) (LDH) ENZYME: CPT | Performed by: INTERNAL MEDICINE

## 2025-03-12 NOTE — PROGRESS NOTES
[facility]  [address] [place] [address]  Phone: [phone], Fax: [phone]      Outpatient Visit Note:    Patient: Zahida Grove  MRN: [id number]  : 1995  JD: Mar 12, 2025    Zahida Grove is a 29 year old  woman with a history of immune-mediated TTP who returns for routine follow up.      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  We will continue observation.    Recommendations:  Offered reassurance that her symptoms are common after iTTP  CBC, LDH and GHNSFC02 testing for monitoring today  Discussed ongoing trials of caplacizumab and cDJMJKY68 without PLEX and upcoming observational studies of iTTP in remission (brain MRIs and cognitive testing)  If RXNBQM24 is normal then labs in 6 months and return to clinic in 12 months.    35 minutes spent on the date of the encounter doing chart review, review of test results, interpretation of tests, patient visit and documentation     Damián Modi MD  Associate Professor of Medicine, Division of Hematology, Oncology and Transplantation  [employer]    --------------------------------------------------------  Forward History:  2018 with new anemia and thrombocytopenia to local hospital, initially thought to be ITP then TTP recognized and started on TPE.  MDWGFA97 deficiency confirmed and + inhibitor.  Treated with Pred 1 mg/kg/day  2018 found to have recurrent thrombocytopenia as an outpatient and readmitted for treatment of an exacerbation. Had not tapered steroids yet  Seen in consultation on 2018 at this clinic, planned to continue Pred 1 mg/kg until  and then 4 week taper of Prednisone to end Aug 13  Rituximab 375mg/m2 given weekly x 4, last dose 8/7/18  Aug 2018 SVDTML35 112%  2019  "HDCBHC67 144%  Jan 2020 FSUWOU00 129%  Sept 2020 OGWUWP88 107%  Dec 2020 AGRMTT65 86%  Mar 2021 DGTALE97 71%  July 2021 ESCUKR31 42%  Jan 2022 TVEYWJ01 22%  Feb 2022 MTSMSJ76 relapse, rituximab 275 mg/m2 x 4 weekly doses, RTWWGQ11 91%  July 2022 FNNMPY86 141%  Nov 2023 ZGHUUQ99 >100%      History: Zahida Grove is a 29 year old young woman who is otherwise healthy but had severe episode of immune mediated TTP in 2018 and ZDBTDV96 relapse in 2022 treated with rituxiamb.      She reports she is doing well today but she has had problems with memory and concentration long-term.  Otherwise she is doing well in a new job and feeling less stress.    Objective:  /73 (BP Location: Right arm, Patient Position: Sitting)   Pulse 70   Temp 97.4  F (36.3  C) (Tympanic)   Ht 1.753 m (5' 9\")   Wt 101.5 kg (223 lb 11.2 oz)   SpO2 100%   BMI 33.03 kg/m     Exam:   Gen: Appears well, no distress  HEENT: no scleral icterus or hemorrhage, no wet purpura, no lymphadenopathy  Ext: no edema  Neuro: no deficients and affect is normal     Labs:   Latest Reference Range & Units 03/12/25 15:41   WBC 4.0 - 11.0 10e3/uL 6.7   Hemoglobin 11.7 - 15.7 g/dL 14.3   Hematocrit 35.0 - 47.0 % 41.2   Platelet Count 150 - 450 10e3/uL 367       Imaging:  none    "

## 2025-03-18 LAB — VWF CP ACT PPP CHRO-CCNC: 0.79 IU/ML (ref 0.4–1.3)

## 2025-03-20 ENCOUNTER — TELEPHONE (OUTPATIENT)
Dept: HEMATOLOGY | Facility: CLINIC | Age: 30
End: 2025-03-20
Payer: COMMERCIAL

## 2025-03-20 NOTE — CONFIDENTIAL NOTE
[id number]  Zahida Grove  29 year old female  [facility] Diagnosis: ttp  [facility] Provider: Rian BOSS called Zahida to discuss her recent lab results:     Latest Reference Range & Units 03/12/25 15:41   Lactate Dehydrogenase 0 - 250 U/L 246   WBC 4.0 - 11.0 10e3/uL 6.7   Hemoglobin 11.7 - 15.7 g/dL 14.3   Hematocrit 35.0 - 47.0 % 41.2   Platelet Count 150 - 450 10e3/uL 367   RBC Count 3.80 - 5.20 10e6/uL 5.06   MCV 78 - 100 fL 81   MCH 26.5 - 33.0 pg 28.3   MCHC 31.5 - 36.5 g/dL 34.7   RDW 10.0 - 15.0 % 13.0   % Neutrophils % 73   % Lymphocytes % 13   % Monocytes % 11   % Eosinophils % 3   % Basophils % 1   Absolute Basophils 0.0 - 0.2 10e3/uL 0.1   Absolute Eosinophils 0.0 - 0.7 10e3/uL 0.2   Absolute Immature Granulocytes <=0.4 10e3/uL 0.0   Absolute Lymphocytes 0.8 - 5.3 10e3/uL 0.8   Absolute Monocytes 0.0 - 1.3 10e3/uL 0.7   % Immature Granulocytes % 0   Absolute Neutrophils 1.6 - 8.3 10e3/uL 4.9   Absolute NRBCs 10e3/uL 0.0   NRBCs per 100 WBC <1 /100 0   FKOKHC93 Activity [place] 0.40 - 1.30 IU/mL 0.79     She did not answer. A detailed voicemail was left and call back number provided should she need it. We will reach out in 6 months via text for follow up lab appt scheduling.     Kely Denise  MSN, RN, PHN  [facility]  Office: [phone]  Fax: [phone]

## 2025-08-18 ENCOUNTER — TELEPHONE (OUTPATIENT)
Dept: HEMATOLOGY | Facility: CLINIC | Age: 30
End: 2025-08-18
Payer: COMMERCIAL

## 2025-08-18 DIAGNOSIS — M31.19 ACQUIRED TTP (H): Primary | ICD-10-CM

## (undated) RX ORDER — LIDOCAINE HYDROCHLORIDE 10 MG/ML
INJECTION, SOLUTION INFILTRATION; PERINEURAL
Status: DISPENSED
Start: 2018-08-09